# Patient Record
Sex: FEMALE | Race: WHITE | NOT HISPANIC OR LATINO | URBAN - METROPOLITAN AREA
[De-identification: names, ages, dates, MRNs, and addresses within clinical notes are randomized per-mention and may not be internally consistent; named-entity substitution may affect disease eponyms.]

---

## 2017-02-13 ENCOUNTER — INPATIENT (INPATIENT)
Facility: HOSPITAL | Age: 32
LOS: 4 days | Discharge: HOME | End: 2017-02-18
Attending: INTERNAL MEDICINE | Admitting: HOSPITALIST
Payer: COMMERCIAL

## 2017-02-13 VITALS
OXYGEN SATURATION: 100 % | HEART RATE: 75 BPM | TEMPERATURE: 98 F | DIASTOLIC BLOOD PRESSURE: 76 MMHG | RESPIRATION RATE: 16 BRPM | WEIGHT: 250 LBS | SYSTOLIC BLOOD PRESSURE: 125 MMHG

## 2017-02-13 LAB
ALBUMIN SERPL ELPH-MCNC: 3.4 G/DL — SIGNIFICANT CHANGE UP (ref 3.3–5)
ALP SERPL-CCNC: 110 U/L — SIGNIFICANT CHANGE UP (ref 40–120)
ALT FLD-CCNC: 43 U/L — SIGNIFICANT CHANGE UP (ref 12–78)
ANION GAP SERPL CALC-SCNC: 11 MMOL/L — SIGNIFICANT CHANGE UP (ref 5–17)
AST SERPL-CCNC: 34 U/L — SIGNIFICANT CHANGE UP (ref 15–37)
BASOPHILS # BLD AUTO: 0.1 K/UL — SIGNIFICANT CHANGE UP (ref 0–0.2)
BASOPHILS NFR BLD AUTO: 1 % — SIGNIFICANT CHANGE UP (ref 0–2)
BILIRUB SERPL-MCNC: 0.3 MG/DL — SIGNIFICANT CHANGE UP (ref 0.2–1.2)
BUN SERPL-MCNC: 14 MG/DL — SIGNIFICANT CHANGE UP (ref 7–23)
CALCIUM SERPL-MCNC: 9 MG/DL — SIGNIFICANT CHANGE UP (ref 8.5–10.1)
CHLORIDE SERPL-SCNC: 107 MMOL/L — SIGNIFICANT CHANGE UP (ref 96–108)
CO2 SERPL-SCNC: 24 MMOL/L — SIGNIFICANT CHANGE UP (ref 22–31)
CREAT SERPL-MCNC: 0.78 MG/DL — SIGNIFICANT CHANGE UP (ref 0.5–1.3)
EOSINOPHIL # BLD AUTO: 0.1 K/UL — SIGNIFICANT CHANGE UP (ref 0–0.5)
EOSINOPHIL NFR BLD AUTO: 1 % — SIGNIFICANT CHANGE UP (ref 0–6)
GLUCOSE SERPL-MCNC: 124 MG/DL — HIGH (ref 70–99)
HCT VFR BLD CALC: 41.9 % — SIGNIFICANT CHANGE UP (ref 34.5–45)
HGB BLD-MCNC: 14.6 G/DL — SIGNIFICANT CHANGE UP (ref 11.5–15.5)
LYMPHOCYTES # BLD AUTO: 26 % — SIGNIFICANT CHANGE UP (ref 13–44)
LYMPHOCYTES # BLD AUTO: 5.8 K/UL — HIGH (ref 1–3.3)
MCHC RBC-ENTMCNC: 29.8 PG — SIGNIFICANT CHANGE UP (ref 27–34)
MCHC RBC-ENTMCNC: 34.8 GM/DL — SIGNIFICANT CHANGE UP (ref 32–36)
MCV RBC AUTO: 85.8 FL — SIGNIFICANT CHANGE UP (ref 80–100)
MONOCYTES # BLD AUTO: 1 K/UL — HIGH (ref 0–0.9)
MONOCYTES NFR BLD AUTO: 7 % — SIGNIFICANT CHANGE UP (ref 2–14)
NEUTROPHILS # BLD AUTO: 15.4 K/UL — HIGH (ref 1.8–7.4)
NEUTROPHILS NFR BLD AUTO: 63 % — SIGNIFICANT CHANGE UP (ref 43–77)
PLAT MORPH BLD: NORMAL — SIGNIFICANT CHANGE UP
PLATELET # BLD AUTO: 369 K/UL — SIGNIFICANT CHANGE UP (ref 150–400)
POTASSIUM SERPL-MCNC: 3.4 MMOL/L — LOW (ref 3.5–5.3)
POTASSIUM SERPL-SCNC: 3.4 MMOL/L — LOW (ref 3.5–5.3)
PROT SERPL-MCNC: 7.8 GM/DL — SIGNIFICANT CHANGE UP (ref 6–8.3)
RAPID RVP RESULT: DETECTED
RBC # BLD: 4.88 M/UL — SIGNIFICANT CHANGE UP (ref 3.8–5.2)
RBC # FLD: 12.9 % — SIGNIFICANT CHANGE UP (ref 10.3–14.5)
RBC BLD AUTO: NORMAL — SIGNIFICANT CHANGE UP
RSV RNA SPEC QL NAA+PROBE: DETECTED
SODIUM SERPL-SCNC: 142 MMOL/L — SIGNIFICANT CHANGE UP (ref 135–145)
VARIANT LYMPHS # BLD: 2 % — SIGNIFICANT CHANGE UP (ref 0–6)
WBC # BLD: 22.4 K/UL — HIGH (ref 3.8–10.5)
WBC # FLD AUTO: 22.4 K/UL — HIGH (ref 3.8–10.5)

## 2017-02-13 PROCEDURE — 93010 ELECTROCARDIOGRAM REPORT: CPT

## 2017-02-13 PROCEDURE — 71020: CPT | Mod: 26

## 2017-02-13 PROCEDURE — 99285 EMERGENCY DEPT VISIT HI MDM: CPT

## 2017-02-13 PROCEDURE — 71250 CT THORAX DX C-: CPT | Mod: 26

## 2017-02-13 RX ORDER — SODIUM CHLORIDE 9 MG/ML
1000 INJECTION INTRAMUSCULAR; INTRAVENOUS; SUBCUTANEOUS ONCE
Qty: 0 | Refills: 0 | Status: COMPLETED | OUTPATIENT
Start: 2017-02-13 | End: 2017-02-13

## 2017-02-13 RX ORDER — IPRATROPIUM/ALBUTEROL SULFATE 18-103MCG
3 AEROSOL WITH ADAPTER (GRAM) INHALATION ONCE
Qty: 0 | Refills: 0 | Status: COMPLETED | OUTPATIENT
Start: 2017-02-13 | End: 2017-02-13

## 2017-02-13 RX ORDER — MAGNESIUM SULFATE 500 MG/ML
2 VIAL (ML) INJECTION ONCE
Qty: 0 | Refills: 0 | Status: COMPLETED | OUTPATIENT
Start: 2017-02-13 | End: 2017-02-13

## 2017-02-13 RX ADMIN — Medication 3 MILLILITER(S): at 21:01

## 2017-02-13 RX ADMIN — Medication 50 GRAM(S): at 22:00

## 2017-02-13 RX ADMIN — SODIUM CHLORIDE 1500 MILLILITER(S): 9 INJECTION INTRAMUSCULAR; INTRAVENOUS; SUBCUTANEOUS at 21:00

## 2017-02-13 RX ADMIN — Medication 125 MILLIGRAM(S): at 21:01

## 2017-02-13 RX ADMIN — Medication 3 MILLILITER(S): at 21:30

## 2017-02-13 RX ADMIN — Medication 3 MILLILITER(S): at 20:00

## 2017-02-13 NOTE — ED STATDOCS - OBJECTIVE STATEMENT
30 y/o female with hx of asthma presents to the ED c/o cough and wheezing. Pt states she has been on steroids several times in the last several weeks and recently started another course of steroids. She states over the last couple of days cough and wheezing worsened and was told to come in by pulmonologist. She used rescue inhaler without significant relief. Pulmonologist= Luis.

## 2017-02-13 NOTE — ED STATDOCS - MEDICAL DECISION MAKING DETAILS
31y F w/ asthma exacerbation failing outpatient therapy.  Plan for duonebs, steroids.  Given long time course, also assess w/ CT Chest.  If no improvement with nebs, consider mg.

## 2017-02-13 NOTE — ED STATDOCS - DETAILS:
I, Yoni Oviedo DO,  performed the initial face to face bedside interview with this patient regarding history of present illness, review of symptoms and relevant past medical, social and family history.  I completed an independent physical examination.  I was the initial provider who evaluated this patient. I have signed out the follow up of any pending tests (i.e. labs, radiological studies) to the ACP.  I have communicated the patient’s plan of care and disposition with the ACP.  The history, relevant review of systems, past medical and surgical history, medical decision making, and physical examination was documented by the scribe in my presence and I attest to the accuracy of the documentation.

## 2017-02-13 NOTE — ED STATDOCS - PROGRESS NOTE DETAILS
32 y/o female with hx of asthma presents to the ED c/o cough and wheezing. Pt states she has been on steroids several times in the last several weeks and recently started another course of steroids. She states over the last couple of days cough and wheezing worsened and was told to come in by pulmonologist. She used rescue inhaler without significant relief. Pulmonologist= Luis. Pt to be sent to main ED for further evaluation. Yoni Oviedo DO (Attending): Discussed all results with patient and family.  Reassessed at bedside, full resolution of inspiratory wheezes, expiratory wheezes persistent but improved with much improved air entry.  Pt feels better but only 50%.  Given persistent respiratory symptoms, will admit to hospitalist for continued treatment.

## 2017-02-13 NOTE — ED STATDOCS - NS ED MD SCRIBE ATTENDING SCRIBE SECTIONS
DISPOSITION/PROGRESS NOTE PAST MEDICAL/SURGICAL/SOCIAL HISTORY/REVIEW OF SYSTEMS/HISTORY OF PRESENT ILLNESS/PHYSICAL EXAM/DISPOSITION

## 2017-02-14 LAB
ANION GAP SERPL CALC-SCNC: 9 MMOL/L — SIGNIFICANT CHANGE UP (ref 5–17)
BASOPHILS # BLD AUTO: 0.1 K/UL — SIGNIFICANT CHANGE UP (ref 0–0.2)
BASOPHILS NFR BLD AUTO: 0.4 % — SIGNIFICANT CHANGE UP (ref 0–2)
BUN SERPL-MCNC: 10 MG/DL — SIGNIFICANT CHANGE UP (ref 7–23)
CALCIUM SERPL-MCNC: 8.6 MG/DL — SIGNIFICANT CHANGE UP (ref 8.5–10.1)
CHLORIDE SERPL-SCNC: 110 MMOL/L — HIGH (ref 96–108)
CO2 SERPL-SCNC: 23 MMOL/L — SIGNIFICANT CHANGE UP (ref 22–31)
CREAT SERPL-MCNC: 0.76 MG/DL — SIGNIFICANT CHANGE UP (ref 0.5–1.3)
EOSINOPHIL # BLD AUTO: 0 K/UL — SIGNIFICANT CHANGE UP (ref 0–0.5)
EOSINOPHIL NFR BLD AUTO: 0 % — SIGNIFICANT CHANGE UP (ref 0–6)
GLUCOSE SERPL-MCNC: 154 MG/DL — HIGH (ref 70–99)
HCT VFR BLD CALC: 39.1 % — SIGNIFICANT CHANGE UP (ref 34.5–45)
HGB BLD-MCNC: 13 G/DL — SIGNIFICANT CHANGE UP (ref 11.5–15.5)
LYMPHOCYTES # BLD AUTO: 11 % — LOW (ref 13–44)
LYMPHOCYTES # BLD AUTO: 2.2 K/UL — SIGNIFICANT CHANGE UP (ref 1–3.3)
MCHC RBC-ENTMCNC: 28.7 PG — SIGNIFICANT CHANGE UP (ref 27–34)
MCHC RBC-ENTMCNC: 33.1 GM/DL — SIGNIFICANT CHANGE UP (ref 32–36)
MCV RBC AUTO: 86.5 FL — SIGNIFICANT CHANGE UP (ref 80–100)
MONOCYTES # BLD AUTO: 0.9 K/UL — SIGNIFICANT CHANGE UP (ref 0–0.9)
MONOCYTES NFR BLD AUTO: 4.4 % — SIGNIFICANT CHANGE UP (ref 2–14)
NEUTROPHILS # BLD AUTO: 16.7 K/UL — HIGH (ref 1.8–7.4)
NEUTROPHILS NFR BLD AUTO: 84.2 % — HIGH (ref 43–77)
PLATELET # BLD AUTO: 380 K/UL — SIGNIFICANT CHANGE UP (ref 150–400)
POTASSIUM SERPL-MCNC: 4.2 MMOL/L — SIGNIFICANT CHANGE UP (ref 3.5–5.3)
POTASSIUM SERPL-SCNC: 4.2 MMOL/L — SIGNIFICANT CHANGE UP (ref 3.5–5.3)
RBC # BLD: 4.52 M/UL — SIGNIFICANT CHANGE UP (ref 3.8–5.2)
RBC # FLD: 12.9 % — SIGNIFICANT CHANGE UP (ref 10.3–14.5)
SODIUM SERPL-SCNC: 142 MMOL/L — SIGNIFICANT CHANGE UP (ref 135–145)
WBC # BLD: 19.8 K/UL — HIGH (ref 3.8–10.5)
WBC # FLD AUTO: 19.8 K/UL — HIGH (ref 3.8–10.5)

## 2017-02-14 RX ORDER — AZELASTINE 137 UG/1
2 SPRAY, METERED NASAL
Qty: 0 | Refills: 0 | COMMUNITY

## 2017-02-14 RX ORDER — ELETRIPTAN HYDROBROMIDE 20 MG/1
1 TABLET, FILM COATED ORAL
Qty: 0 | Refills: 0 | COMMUNITY

## 2017-02-14 RX ORDER — POTASSIUM CHLORIDE 20 MEQ
40 PACKET (EA) ORAL ONCE
Qty: 0 | Refills: 0 | Status: COMPLETED | OUTPATIENT
Start: 2017-02-14 | End: 2017-02-14

## 2017-02-14 RX ORDER — OMEPRAZOLE 10 MG/1
1 CAPSULE, DELAYED RELEASE ORAL
Qty: 0 | Refills: 0 | COMMUNITY

## 2017-02-14 RX ORDER — TOPIRAMATE 25 MG
50 TABLET ORAL AT BEDTIME
Qty: 0 | Refills: 0 | Status: DISCONTINUED | OUTPATIENT
Start: 2017-02-14 | End: 2017-02-18

## 2017-02-14 RX ORDER — L.ACIDOPH/B.ANIMALIS/B.LONGUM 15B CELL
2 CAPSULE ORAL
Qty: 0 | Refills: 0 | COMMUNITY

## 2017-02-14 RX ORDER — BROMPHENIRAMINE MALEATE, PSEUDOEPHEDRINE HYDROCHLORIDE, AND DEXTROMETHORPHAN HYDROBROMIDE 2; 10; 30 MG/5ML; MG/5ML; MG/5ML
10 SOLUTION ORAL
Qty: 0 | Refills: 0 | COMMUNITY

## 2017-02-14 RX ORDER — IPRATROPIUM/ALBUTEROL SULFATE 18-103MCG
3 AEROSOL WITH ADAPTER (GRAM) INHALATION EVERY 6 HOURS
Qty: 0 | Refills: 0 | Status: DISCONTINUED | OUTPATIENT
Start: 2017-02-14 | End: 2017-02-15

## 2017-02-14 RX ORDER — ALBUTEROL 90 UG/1
2 AEROSOL, METERED ORAL
Qty: 0 | Refills: 0 | COMMUNITY

## 2017-02-14 RX ORDER — TOPIRAMATE 25 MG
2 TABLET ORAL
Qty: 0 | Refills: 0 | COMMUNITY
Start: 2017-02-14 | End: 2017-02-17

## 2017-02-14 RX ORDER — CEFUROXIME AXETIL 250 MG
1 TABLET ORAL
Qty: 0 | Refills: 0 | COMMUNITY

## 2017-02-14 RX ORDER — BUDESONIDE AND FORMOTEROL FUMARATE DIHYDRATE 160; 4.5 UG/1; UG/1
2 AEROSOL RESPIRATORY (INHALATION)
Qty: 0 | Refills: 0 | COMMUNITY
Start: 2017-02-14

## 2017-02-14 RX ADMIN — Medication 40 MILLIGRAM(S): at 20:40

## 2017-02-14 RX ADMIN — Medication 3 MILLILITER(S): at 20:08

## 2017-02-14 RX ADMIN — Medication 3 MILLILITER(S): at 10:55

## 2017-02-14 RX ADMIN — Medication 40 MILLIEQUIVALENT(S): at 10:38

## 2017-02-14 RX ADMIN — Medication 40 MILLIGRAM(S): at 14:57

## 2017-02-14 RX ADMIN — Medication 3 MILLILITER(S): at 14:12

## 2017-02-14 RX ADMIN — Medication 50 MILLIGRAM(S): at 21:56

## 2017-02-14 NOTE — ED ADULT NURSE REASSESSMENT NOTE - NS ED NURSE REASSESS COMMENT FT1
Received report from Virginia RN at 0200. Patient resting comfortably, no distress noted. No complaints at this time. Eating. Remains on isolation. Will continue to monitor.
Report given to Aliza URIBE
Resting comfortably, VS stable, awaiting admission orders and bed placement. Will continue to monitor.
Patient rec'd from JOJO Abrams. ALert and orient. No acute distress. Sob at times, relief with cough. VSS normal. Oxygen level normal. Awaiting for bed assignment. Isolation in place. Safety and comfort maintained.

## 2017-02-14 NOTE — H&P ADULT - ASSESSMENT
31y female admitted with    *asthma exacerbation, +RSV  - failed outpt high dose po steroids  - continue IV steroids  - isolation  - bronchodilators  - consult Dr. Smith    *leukocytosis  - likely due to recent high dose steroids outpatient  - repeat cbc w/ diff  - afebrile, no infiltrate on CT chest    *migraines  - topamax  -  will bring other home med    *hypokalemia  - replete    *dvt px  - scds, ambulate

## 2017-02-14 NOTE — H&P ADULT - NSHPPHYSICALEXAM_GEN_ALL_CORE
Vital Signs Last 24 Hrs  T(C): 36.4, Max: 36.7 (02-13 @ 19:25)  T(F): 97.5, Max: 98.1 (02-13 @ 19:25)  HR: 88 (75 - 98)  BP: 125/99 (112/92 - 125/99)  RR: 16 (16 - 20)  SpO2: 98% (98% - 100%)      General: NAD, comfortable, speaking complete sentences w/o difficulty    Neuro: AAOx3, no focal deficits  HEENT: NCAT, PERRLA, EOMI, moist mucous membranes  Neck: Soft and supple, No JVD  Respiratory: diffuse expiratory wheezing, no rales or rhonchi, +coughing w/ deep inspiration  Cardiovascular: S1 and S2, RRR, no m/g/r  Gastrointestinal: +BS, soft, NTND, no rebound/guarding  Extremities: No c/c/e  Vascular: 2+ peripheral pulses  Musculoskeletal: 5/5 strength b/l UE and LE, sensation intact  Skin: No rashes

## 2017-02-14 NOTE — H&P ADULT - PMH
Asthma    Chiari malformation type I    Nonintractable migraine, unspecified migraine type    PCOS (polycystic ovarian syndrome)

## 2017-02-14 NOTE — H&P ADULT - NSHPLABSRESULTS_GEN_ALL_CORE
14.6   22.4  )-----------( 369      ( 13 Feb 2017 20:56 )             41.9     13 Feb 2017 20:56    142    |  107    |  14     ----------------------------<  124    3.4     |  24     |  0.78     Ca    9.0        13 Feb 2017 20:56    Rapid Respiratory Viral Panel (02.13.17 @ 20:56)    Rapid RVP Result: Detected: The FilmArray RVP Rapid uses polymerase chain reaction (PCR) and melt  curve analysis to screen for adenovirus; coronavirus HKU1, NL63, 229E,  OC43; human metapneumovirus (hMPV); human enterovirus/rhinovirus  (Entero/RV); influenza A; influenza A/H1;Detected: influenza A/H3; influenza  A/H1-2009; influenza B; parainfluenza viruses 1, 2, 3, 4; respiratory  syncytial virus; Bordetella pertussis; Mycoplasma pneumoniae; and  Chlamydophila pneumoniae.    Resp Syncytial Virus (RapRVP): Detected: Called to Nurse/Doctor. BRIDGER Barboza RN 02/13/17 23:30        EXAM:  CT CHEST                       02/13/2017  - no focal consolidation

## 2017-02-14 NOTE — H&P ADULT - NSHPREVIEWOFSYSTEMS_GEN_ALL_CORE
REVIEW OF SYSTEMS:    General: No weakness, fevers, chills  HEENT: +migraine, no neck pain, no visual changes, no hearing loss   Resp: +cough nonproductive, +wheezing, no hemoptysis; No shortness of breath  CV: No chest pain or palpitations  GI: No abdominal pain, no n/v/d, no blood in stool  : No f/u/d  Neuro: No weakness or numbness  MSK: No myalgias, arthralgias  SKIN: No itching, rashes, lesions  All other ROS negative unless indicated above.

## 2017-02-14 NOTE — H&P ADULT - HISTORY OF PRESENT ILLNESS
31y female w/ PMH PCOS, migraines, asthma presents to ED w/ worsening cough, wheezing and dyspnea over past 5days.  Pt states she has been experiencing similar sx, but less severe, since early January.  She has followed up with her Pulmonologist multiple times over past 2 months and was started on a second high dose steroid taper last week as well as Ceftin.   Wheezing and sob worsened over weekend requiring her to use entire rescue inhaler without relief.  Cough nonproductive.  Sob only w/ cough.  Denies chest pain, fevers, n/v/d, abd pain.  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  No recent travel.  Has had ER visits in past for asthma exacerbation but never required admission.      In ED pt received-   Reports feeling  slightly better- still wheezing.  Denies shortness of breath.

## 2017-02-15 RX ORDER — TIOTROPIUM BROMIDE 18 UG/1
1 CAPSULE ORAL; RESPIRATORY (INHALATION) DAILY
Qty: 0 | Refills: 0 | Status: DISCONTINUED | OUTPATIENT
Start: 2017-02-15 | End: 2017-02-16

## 2017-02-15 RX ORDER — PANTOPRAZOLE SODIUM 20 MG/1
40 TABLET, DELAYED RELEASE ORAL
Qty: 0 | Refills: 0 | Status: DISCONTINUED | OUTPATIENT
Start: 2017-02-15 | End: 2017-02-18

## 2017-02-15 RX ORDER — LACTOBACILLUS ACIDOPHILUS 100MM CELL
1 CAPSULE ORAL
Qty: 0 | Refills: 0 | Status: DISCONTINUED | OUTPATIENT
Start: 2017-02-15 | End: 2017-02-18

## 2017-02-15 RX ORDER — IPRATROPIUM/ALBUTEROL SULFATE 18-103MCG
3 AEROSOL WITH ADAPTER (GRAM) INHALATION EVERY 4 HOURS
Qty: 0 | Refills: 0 | Status: DISCONTINUED | OUTPATIENT
Start: 2017-02-15 | End: 2017-02-18

## 2017-02-15 RX ORDER — ALBUTEROL 90 UG/1
1 AEROSOL, METERED ORAL EVERY 4 HOURS
Qty: 0 | Refills: 0 | Status: DISCONTINUED | OUTPATIENT
Start: 2017-02-15 | End: 2017-02-18

## 2017-02-15 RX ADMIN — Medication 3 MILLILITER(S): at 01:40

## 2017-02-15 RX ADMIN — Medication 40 MILLIGRAM(S): at 15:37

## 2017-02-15 RX ADMIN — Medication 40 MILLIGRAM(S): at 08:24

## 2017-02-15 RX ADMIN — Medication 50 MILLIGRAM(S): at 22:11

## 2017-02-15 RX ADMIN — Medication 3 MILLILITER(S): at 12:14

## 2017-02-15 RX ADMIN — Medication 40 MILLIGRAM(S): at 22:09

## 2017-02-15 RX ADMIN — Medication 3 MILLILITER(S): at 08:42

## 2017-02-15 RX ADMIN — Medication 3 MILLILITER(S): at 19:21

## 2017-02-15 RX ADMIN — Medication 3 MILLILITER(S): at 15:55

## 2017-02-15 RX ADMIN — Medication 40 MILLIGRAM(S): at 02:35

## 2017-02-15 RX ADMIN — Medication 1 TABLET(S): at 17:27

## 2017-02-15 RX ADMIN — PANTOPRAZOLE SODIUM 40 MILLIGRAM(S): 20 TABLET, DELAYED RELEASE ORAL at 17:27

## 2017-02-15 NOTE — PROGRESS NOTE ADULT - ASSESSMENT
Assessment		  31y female admitted with    *asthma exacerbation, +RSV  - failed outpt high dose po steroids  - continue IV steroids  - isolation  - bronchodilators  - Pulm consult appreciated     *leukocytosis  - likely due to recent high dose steroids outpatient  - repeat cbc w/ diff- improving   - afebrile, no infiltrate on CT chest    *migraines  - topamax  -  will bring other home med    *hypokalemia  - repleted    *dvt px  - scds, ambulate

## 2017-02-15 NOTE — CONSULT NOTE ADULT - ASSESSMENT
PROBLEMS;    Asthma exacerbation, +RSV  leukocytosis-due to recent high dose steroids-afebrile-no infiltrate on CT chest  Migraines    PLAN;    IV STEROIDS  AEROSOLS  COUGH SUPPRASANTS  OOB  DVT PROPHYLASIX

## 2017-02-15 NOTE — CONSULT NOTE ADULT - SUBJECTIVE AND OBJECTIVE BOX
HPI:    pat seen & examine & full consult dictated.    PAST MEDICAL & SURGICAL HISTORY:  Nonintractable migraine, unspecified migraine type  Chiari malformation type I  PCOS (polycystic ovarian syndrome)  Asthma  No significant past surgical history      MEDICATIONS  (STANDING):  methylPREDNISolone sodium succinate Injectable 40milliGRAM(s) IV Push every 6 hours  topiramate 50milliGRAM(s) Oral at bedtime  ALBUTerol/ipratropium for Nebulization 3milliLiter(s) Nebulizer every 6 hours    MEDICATIONS  (PRN):      Allergies    No Known Allergies    Intolerances        SOCIAL HISTORY: Denies tobacco, etoh abuse or illicit drug use    FAMILY HISTORY:  No pertinent family history in first degree relatives      Vital Signs Last 24 Hrs  T(C): 36.3, Max: 36.8 (02-14 @ 12:53)  T(F): 97.4, Max: 98.2 (02-14 @ 12:53)  HR: 71 (64 - 101)  BP: 113/67 (94/57 - 115/78)  BP(mean): --  RR: 20 (18 - 21)  SpO2: 96% (95% - 100%)    REVIEW OF SYSTEMS:    CONSTITUTIONAL:  As per HPI.  HEENT:  Eyes:  No diplopia or blurred vision. ENT:  No earache, sore throat or runny nose.  CARDIOVASCULAR:  No pressure, squeezing, tightness, heaviness or aching about the chest, neck, axilla or epigastrium.  RESPIRATORY:  No cough, shortness of breath, PND or orthopnea.  GASTROINTESTINAL:  No nausea, vomiting or diarrhea.  GENITOURINARY:  No dysuria, frequency or urgency.  MUSCULOSKELETAL:  As per HPI.  SKIN:  No change in skin, hair or nails.  NEUROLOGIC:  No paresthesias, fasciculations, seizures or weakness.  PSYCHIATRIC:  No disorder of thought or mood.  ENDOCRINE:  No heat or cold intolerance, polyuria or polydipsia.  HEMATOLOGICAL:  No easy bruising or bleedings:  .     PHYSICAL EXAMINATION:    GENERAL APPEARANCE:  Pt. is not currently dyspneic, in no distress. Pt. is alert, oriented, and pleasant.  HEENT:  Pupils are normal and react normally. No icterus. Mucous membranes well colored.  NECK:  Supple. No lymphadenopathy. Jugular venous pressure not elevated. Carotids equal.   HEART:   The cardiac impulse has a normal quality. Regular. Normal S1 and S2. There are no murmurs, rubs or gallops noted  CHEST:  Chest is clear to auscultation. Normal respiratory effort.  ABDOMEN:  Soft and nontender.   EXTREMITIES:  There is no cyanosis, clubbing or edema.   SKIN:  No rash or significant lesions are noted.    LABS:                        13.0   19.8  )-----------( 380      ( 14 Feb 2017 11:44 )             39.1     14 Feb 2017 11:44    142    |  110    |  10     ----------------------------<  154    4.2     |  23     |  0.76     Ca    8.6        14 Feb 2017 11:44    TPro  7.8    /  Alb  3.4    /  TBili  0.3    /  DBili  x      /  AST  34     /  ALT  43     /  AlkPhos  110    13 Feb 2017 20:56    LIVER FUNCTIONS - ( 13 Feb 2017 20:56 )  Alb: 3.4 g/dL / Pro: 7.8 gm/dL / ALK PHOS: 110 U/L / ALT: 43 U/L / AST: 34 U/L / GGT: x           Rapid Respiratory Viral Panel (02.13.17 @ 20:56)    Rapid RVP Result: Detected: The FilmArray RVP Rapid uses polymerase chain reaction (PCR) and melt  curve analysis to screen for adenovirus; coronavirus HKU1, NL63, 229E,  OC43; human metapneumovirus (hMPV); human enterovirus/rhinovirus  (Entero/RV); influenza A; influenza A/H1;Detected: influenza A/H3; influenza  A/H1-2009; influenza B; parainfluenza viruses 1, 2, 3, 4; respiratory  syncytial virus; Bordetella pertussis; Mycoplasma pneumoniae; and  Chlamydophila pneumoniae.  Resp Syncytial Virus (RapRVP): Detected: Called to Nurse/Doctor. BRIDGER Barboza RN 02/13/17 23:30    ct of chest;    PLEURA: No pleural effusion or pneumothorax.  HEART: Normal size. No pericardial effusion.  VESSELS: Limited evaluation without intravenous contrast. Normal caliber   of the major intrathoracic vessels.   CHEST WALL AND LOWER NECK: Within normal limits.   MEDIASTINUM ANDHILA: Subcentimeter mediastinal lymph nodes without   lymLUNGS AND AIRWAYS: Patent central airways. No focal consolidation.   phadenopathy.  UPPER ABDOMEN: Layering of density in the gallbladder, which may   represent sludge.   BONES: Degenerative changes of the spine. Age indeterminate, superior   endplate depressionat T12 vertebral body, felt to be chronic.    IMPRESSION:    No focal consolidation. Additional findings as described.            RADIOLOGY & ADDITIONAL STUDIES:

## 2017-02-15 NOTE — PROGRESS NOTE ADULT - SUBJECTIVE AND OBJECTIVE BOX
History and Physical:   Source of Information	Chart(s), Patient	  Outpatient Providers	Dr. Smith	    Language:  · Patient/Family of Limited English Proficiency	No	      History of Present Illness:  Chief Complaint/Reason for Admission: cough, wheezing, shortness of breath	  History of Present Illness: 	  31y female w/ PMH PCOS, migraines, asthma presents to ED w/ worsening cough, wheezing and dyspnea over past 5days.  Pt states she has been experiencing similar sx, but less severe, since early January.  She has followed up with her Pulmonologist multiple times over past 2 months and was started on a second high dose steroid taper last week as well as Ceftin.   Wheezing and sob worsened over weekend requiring her to use entire rescue inhaler without relief.  Cough nonproductive.  Sob only w/ cough.  Denies chest pain, fevers, n/v/d, abd pain.  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  No recent travel.  Has had ER visits in past for asthma exacerbation but never required admission.      Reports feeling  slightly better- still wheezing.  Denies shortness of breath.     2/15-       Review of Systems:  Review of Systems: REVIEW OF SYSTEMS:  General: No weakness, fevers, chills HEENT: +migraine, no neck pain, no visual changes, no hearing loss  Resp: +cough nonproductive, +wheezing, no hemoptysis; No shortness of breath CV: No chest pain or palpitations GI: No abdominal pain, no n/v/d, no blood in stool : No f/u/d Neuro: No weakness or numbness MSK: No myalgias, arthralgias SKIN: No itching, rashes, lesions All other ROS negative unless indicated above.	      Vital Signs Last 24 Hrs  T(C): 36.9, Max: 36.9 (02-15 @ 13:15)  T(F): 98.4, Max: 98.4 (02-15 @ 13:15)  HR: 94 (64 - 101)  BP: 114/68 (94/57 - 115/78)  RR: 18 (18 - 21)  SpO2: 98% (95% - 100%)    Physical Exam:  General: NAD, comfortable, speaking complete sentences w/o difficulty Neuro: AAOx3, no focal deficits HEENT: NCAT, PERRLA, EOMI, moist mucous membranes Neck: Soft and supple, No JVD Respiratory: diffuse expiratory wheezing, no rales or rhonchi, +coughing w/ deep inspiration Cardiovascular: S1 and S2, RRR, no m/g/r Gastrointestinal: +BS, soft, NTND, no rebound/guarding Extremities: No c/c/e Vascular: 2+ peripheral pulses Musculoskeletal: 5/5 strength b/l UE and LE, sensation intact Skin: No rashes	      Labs and Results:        LABS: All Labs Reviewed:                        13.0   19.8  )-----------( 380      ( 14 Feb 2017 11:44 )             39.1     14 Feb 2017 11:44    142    |  110    |  10     ----------------------------<  154    4.2     |  23     |  0.76     Ca    8.6        14 Feb 2017 11:44    TPro  7.8    /  Alb  3.4    /  TBili  0.3    /  DBili  x      /  AST  34     /  ALT  43     /  AlkPhos  110    13 Feb 2017 20:56          Blood Culture: 02-13 @ 21:49  Organism --  Gram Stain Blood -- Gram Stain --  Specimen Source .Blood None  Culture-Blood --            Labs, Radiology, Cardiology, and Other Results: 14.6  22.4  )-----------( 369      ( 13 Feb 2017 20:56 )            41.9   13 Feb 2017 20:56  142    |  107    |  14    ----------------------------<  124   3.4     |  24     |  0.78   Ca    9.0        13 Feb 2017 20:56  Rapid Respiratory Viral Panel (02.13.17 @ 20:56)   Rapid RVP Result: Detected: The FilmArray RVP Rapid uses polymerase chain reaction (PCR) and melt curve analysis to screen for adenovirus; coronavirus HKU1, NL63, 229E, OC43; human metapneumovirus (hMPV); human enterovirus/rhinovirus (Entero/RV); influenza A; influenza A/H1;Detected: influenza A/H3; influenza A/H1-2009; influenza B; parainfluenza viruses 1, 2, 3, 4; respiratory syncytial virus; Bordetella pertussis; Mycoplasma pneumoniae; and Chlamydophila pneumoniae.   Resp Syncytial Virus (RapRVP): Detected: Called to Nurse/Doctor. BRIDGER Barboza RN 02/13/17 23:30    EXAM:  CT CHEST                       02/13/2017  - no focal consolidation History and Physical:   Source of Information	Chart(s), Patient	  Outpatient Providers	Dr. Smith	    Language:  · Patient/Family of Limited English Proficiency	No	      History of Present Illness:  Chief Complaint/Reason for Admission: cough, wheezing, shortness of breath	  History of Present Illness: 	  31y female w/ PMH PCOS, migraines, asthma presents to ED w/ worsening cough, wheezing and dyspnea over past 5days.  Pt states she has been experiencing similar sx, but less severe, since early January.  She has followed up with her Pulmonologist multiple times over past 2 months and was started on a second high dose steroid taper last week as well as Ceftin.   Wheezing and sob worsened over weekend requiring her to use entire rescue inhaler without relief.  Cough nonproductive.  Sob only w/ cough.  Denies chest pain, fevers, n/v/d, abd pain.  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  No recent travel.  Has had ER visits in past for asthma exacerbation but never required admission.      Reports feeling  slightly better- still wheezing.  Denies shortness of breath.     2/15- cough persists.  No other complaints.       Review of Systems:  Review of Systems:  10 point ROS neg except for above. 	      Vital Signs Last 24 Hrs  T(C): 36.9, Max: 36.9 (02-15 @ 13:15)  T(F): 98.4, Max: 98.4 (02-15 @ 13:15)  HR: 94 (64 - 101)  BP: 114/68 (94/57 - 115/78)  RR: 18 (18 - 21)  SpO2: 98% (95% - 100%)    Physical Exam:  General: NAD, comfortable, speaking complete sentences w/o difficulty Neuro: AAOx3, no focal deficits HEENT: NCAT, PERRLA, EOMI, moist mucous membranes Neck: Soft and supple, No JVD Respiratory: diffuse expiratory wheezing slightly improved, no rales or rhonchi, +coughing w/ deep inspiration Cardiovascular: S1 and S2, RRR, no m/g/r Gastrointestinal: +BS, soft, NTND, no rebound/guarding Extremities: No c/c/e Vascular: 2+ peripheral pulses Musculoskeletal: 5/5 strength b/l UE and LE, sensation intact Skin: No rashes	      Labs and Results:        LABS: All Labs Reviewed:                        13.0   19.8  )-----------( 380      ( 14 Feb 2017 11:44 )             39.1     14 Feb 2017 11:44    142    |  110    |  10     ----------------------------<  154    4.2     |  23     |  0.76     Ca    8.6        14 Feb 2017 11:44    TPro  7.8    /  Alb  3.4    /  TBili  0.3    /  DBili  x      /  AST  34     /  ALT  43     /  AlkPhos  110    13 Feb 2017 20:56          Blood Culture: 02-13 @ 21:49  Organism --  Gram Stain Blood -- Gram Stain --  Specimen Source .Blood None  Culture-Blood --            Labs, Radiology, Cardiology, and Other Results: 14.6  22.4  )-----------( 369      ( 13 Feb 2017 20:56 )            41.9   13 Feb 2017 20:56  142    |  107    |  14    ----------------------------<  124   3.4     |  24     |  0.78   Ca    9.0        13 Feb 2017 20:56  Rapid Respiratory Viral Panel (02.13.17 @ 20:56)   Rapid RVP Result: Detected: The FilmArray RVP Rapid uses polymerase chain reaction (PCR) and melt curve analysis to screen for adenovirus; coronavirus HKU1, NL63, 229E, OC43; human metapneumovirus (hMPV); human enterovirus/rhinovirus (Entero/RV); influenza A; influenza A/H1;Detected: influenza A/H3; influenza A/H1-2009; influenza B; parainfluenza viruses 1, 2, 3, 4; respiratory syncytial virus; Bordetella pertussis; Mycoplasma pneumoniae; and Chlamydophila pneumoniae.   Resp Syncytial Virus (RapRVP): Detected: Called to Nurse/Doctor. BRIDGER Barboza RN 02/13/17 23:30    EXAM:  CT CHEST                       02/13/2017  - no focal consolidation

## 2017-02-16 LAB
HCT VFR BLD CALC: 38.8 % — SIGNIFICANT CHANGE UP (ref 34.5–45)
HGB BLD-MCNC: 12.9 G/DL — SIGNIFICANT CHANGE UP (ref 11.5–15.5)
MCHC RBC-ENTMCNC: 28.9 PG — SIGNIFICANT CHANGE UP (ref 27–34)
MCHC RBC-ENTMCNC: 33.2 GM/DL — SIGNIFICANT CHANGE UP (ref 32–36)
MCV RBC AUTO: 87.1 FL — SIGNIFICANT CHANGE UP (ref 80–100)
PLATELET # BLD AUTO: 366 K/UL — SIGNIFICANT CHANGE UP (ref 150–400)
RBC # BLD: 4.45 M/UL — SIGNIFICANT CHANGE UP (ref 3.8–5.2)
RBC # FLD: 12.8 % — SIGNIFICANT CHANGE UP (ref 10.3–14.5)
WBC # BLD: 20.6 K/UL — HIGH (ref 3.8–10.5)
WBC # FLD AUTO: 20.6 K/UL — HIGH (ref 3.8–10.5)

## 2017-02-16 RX ORDER — BUDESONIDE, MICRONIZED 100 %
0.5 POWDER (GRAM) MISCELLANEOUS EVERY 12 HOURS
Qty: 0 | Refills: 0 | Status: DISCONTINUED | OUTPATIENT
Start: 2017-02-16 | End: 2017-02-18

## 2017-02-16 RX ADMIN — Medication 50 MILLIGRAM(S): at 22:19

## 2017-02-16 RX ADMIN — Medication 40 MILLIGRAM(S): at 11:44

## 2017-02-16 RX ADMIN — Medication 3 MILLILITER(S): at 12:10

## 2017-02-16 RX ADMIN — Medication 40 MILLIGRAM(S): at 04:44

## 2017-02-16 RX ADMIN — Medication 3 MILLILITER(S): at 15:24

## 2017-02-16 RX ADMIN — Medication 40 MILLIGRAM(S): at 17:29

## 2017-02-16 RX ADMIN — Medication 3 MILLILITER(S): at 09:01

## 2017-02-16 RX ADMIN — Medication 3 MILLILITER(S): at 00:20

## 2017-02-16 RX ADMIN — Medication 3 MILLILITER(S): at 19:48

## 2017-02-16 RX ADMIN — Medication 1 TABLET(S): at 08:45

## 2017-02-16 RX ADMIN — Medication 3 MILLILITER(S): at 23:55

## 2017-02-16 RX ADMIN — Medication 3 MILLILITER(S): at 04:34

## 2017-02-16 RX ADMIN — PANTOPRAZOLE SODIUM 40 MILLIGRAM(S): 20 TABLET, DELAYED RELEASE ORAL at 08:45

## 2017-02-16 RX ADMIN — Medication 1 TABLET(S): at 18:30

## 2017-02-16 RX ADMIN — Medication 0.5 MILLIGRAM(S): at 19:50

## 2017-02-16 RX ADMIN — Medication 40 MILLIGRAM(S): at 22:19

## 2017-02-16 NOTE — PROGRESS NOTE ADULT - ASSESSMENT
31y female admitted with    *asthma exacerbation, +RSV  - failed outpt high dose po steroids  - continue IV steroids  - isolation  - bronchodilators  - cough suppressants  - Pulm consult appreciated     *leukocytosis  - likely due to recent high dose steroids outpatient  - repeat cbc w/ diff  - afebrile, no infiltrate on CT chest    *migraines  - topamax and home med    *hypokalemia  - repleted    *dvt px  - scds, ambulate

## 2017-02-16 NOTE — PROGRESS NOTE ADULT - SUBJECTIVE AND OBJECTIVE BOX
Subjective:  Awake, alert. Cough sl improved and decreased. Scant clear sputum    MEDICATIONS  (STANDING):  methylPREDNISolone sodium succinate Injectable 40milliGRAM(s) IV Push every 6 hours  topiramate 50milliGRAM(s) Oral at bedtime  HYDROcodone/homatropine Syrup 5milliLiter(s) Oral four times a day  ALBUTerol/ipratropium for Nebulization 3milliLiter(s) Nebulizer every 4 hours  ALBUTerol    90 MICROgram(s) HFA Inhaler 1Puff(s) Inhalation every 4 hours  pantoprazole    Tablet 40milliGRAM(s) Oral before breakfast  lactobacillus acidophilus 1Tablet(s) Oral two times a day with meals  buDESOnide   0.5 milliGRAM(s) Respule 0.5milliGRAM(s) Inhalation every 12 hours    MEDICATIONS  (PRN):  freetext medication  -  Oral <User Schedule> PRN for headache      Allergies    No Known Allergies    Intolerances        Vital Signs Last 24 Hrs  T(C): 2.6, Max: 36.9 (02-15 @ 13:15)  T(F): 36.6, Max: 98.5 (02-15 @ 23:09)  HR: 81 (70 - 94)  BP: 108/68 (106/61 - 115/60)  BP(mean): --  RR: 18 (16 - 18)  SpO2: 98% (94% - 98%)    PHYSICAL EXAMINATION:    NECK:  Supple. No lymphadenopathy. Jugular venous pressure not elevated. Carotids equal.   HEART:   The cardiac impulse has a normal quality. Reg., Nl S1 and S2.  There are no murmurs, rubs or gallops noted  CHEST:  Chest is fairly  clear with minimal end exp wheeze. Normal respiratory effort.  ABDOMEN:  Soft and nontender.   EXTREMITIES:  There is tr edema.       LABS:                        12.9   20.6  )-----------( 366      ( 16 Feb 2017 06:51 )             38.8     14 Feb 2017 11:44    142    |  110    |  10     ----------------------------<  154    4.2     |  23     |  0.76     Ca    8.6        14 Feb 2017 11:44            RADIOLOGY & ADDITIONAL TESTS:    Assessment and Recommendation:   · Assessment		  PROBLEMS;    Asthma exacerbation, +RSV  Tracheitis  leukocytosis-due to recent high dose steroids-afebrile-no infiltrate on CT chest  Migraines    PLAN;    IV STEROIDS-maintain current dose  AEROSOLS-add budesonide  COUGH SUPPRASANTS  OOB  DVT PROPHYLASIX

## 2017-02-16 NOTE — PROGRESS NOTE ADULT - SUBJECTIVE AND OBJECTIVE BOX
History and Physical:   Source of Information	Chart(s), Patient	  Outpatient Providers	Dr. Smith	    Language:  · Patient/Family of Limited English Proficiency	No	      History of Present Illness:  Chief Complaint/Reason for Admission: cough, wheezing, shortness of breath	  History of Present Illness: 	  31y female w/ PMH PCOS, migraines, asthma presents to ED w/ worsening cough, wheezing and dyspnea over past 5days.  Pt states she has been experiencing similar sx, but less severe, since early January.  She has followed up with her Pulmonologist multiple times over past 2 months and was started on a second high dose steroid taper last week as well as Ceftin.   Wheezing and sob worsened over weekend requiring her to use entire rescue inhaler without relief.  Cough nonproductive.  Sob only w/ cough.  Denies chest pain, fevers, n/v/d, abd pain.  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  No recent travel.  Has had ER visits in past for asthma exacerbation but never required admission.      Reports feeling  slightly better- still wheezing.  Denies shortness of breath.     2/15- cough persists.  No other complaints.   2/16- feeling better.  Cough persists, worse after neb tx.        Review of Systems:  Review of Systems:  10 point ROS neg except for above. 	      Vital Signs Last 24 Hrs  T(C): 36.3, Max: 36.9 (02-15 @ 13:15)  T(F): 97.4, Max: 98.5 (02-15 @ 23:09)  HR: 80 (59 - 94)  BP: 118/71 (106/61 - 118/71)  BP(mean): --  RR: 18 (16 - 18)  SpO2: 98% (93% - 98%)      Vital Signs Last 24 Hrs  T(C): 36.9, Max: 36.9 (02-15 @ 13:15)  T(F): 98.4, Max: 98.4 (02-15 @ 13:15)  HR: 94 (64 - 101)  BP: 114/68 (94/57 - 115/78)  RR: 18 (18 - 21)  SpO2: 98% (95% - 100%)    Physical Exam:  General: NAD, comfortable, speaking complete sentences w/o difficulty Neuro: AAOx3, no focal deficits HEENT: NCAT, PERRLA, EOMI, moist mucous membranes Neck: Soft and supple, No JVD Respiratory: mild expiratory wheezing,  no rales or rhonchi, +coughing w/ deep inspiration Cardiovascular: S1 and S2, RRR, no m/g/r Gastrointestinal: +BS, soft, NTND, no rebound/guarding Extremities: No c/c/e Vascular: 2+ peripheral pulses Musculoskeletal: 5/5 strength b/l UE and LE, sensation intact Skin: No rashes	      Labs and Results:        LABS: All Labs Reviewed:                        12.9   20.6  )-----------( 366      ( 16 Feb 2017 06:51 )             38.8         LABS: All Labs Reviewed:                        13.0   19.8  )-----------( 380      ( 14 Feb 2017 11:44 )             39.1     14 Feb 2017 11:44    142    |  110    |  10     ----------------------------<  154    4.2     |  23     |  0.76     Ca    8.6        14 Feb 2017 11:44    TPro  7.8    /  Alb  3.4    /  TBili  0.3    /  DBili  x      /  AST  34     /  ALT  43     /  AlkPhos  110    13 Feb 2017 20:56            Labs, Radiology, Cardiology, and Other Results: 14.6  22.4  )-----------( 369      ( 13 Feb 2017 20:56 )            41.9   13 Feb 2017 20:56  142    |  107    |  14    ----------------------------<  124   3.4     |  24     |  0.78   Ca    9.0        13 Feb 2017 20:56  Rapid Respiratory Viral Panel (02.13.17 @ 20:56)   Rapid RVP Result: Detected: The FilmArray RVP Rapid uses polymerase chain reaction (PCR) and melt curve analysis to screen for adenovirus; coronavirus HKU1, NL63, 229E, OC43; human metapneumovirus (hMPV); human enterovirus/rhinovirus (Entero/RV); influenza A; influenza A/H1;Detected: influenza A/H3; influenza A/H1-2009; influenza B; parainfluenza viruses 1, 2, 3, 4; respiratory syncytial virus; Bordetella pertussis; Mycoplasma pneumoniae; and Chlamydophila pneumoniae.   Resp Syncytial Virus (RapRVP): Detected: Called to Nurse/Doctor. BRIDGER Barboza RN 02/13/17 23:30    EXAM:  CT CHEST                       02/13/2017  - no focal consolidation

## 2017-02-17 LAB
BASOPHILS # BLD AUTO: 0.1 K/UL — SIGNIFICANT CHANGE UP (ref 0–0.2)
BASOPHILS NFR BLD AUTO: 0.5 % — SIGNIFICANT CHANGE UP (ref 0–2)
EOSINOPHIL # BLD AUTO: 0 K/UL — SIGNIFICANT CHANGE UP (ref 0–0.5)
EOSINOPHIL NFR BLD AUTO: 0 % — SIGNIFICANT CHANGE UP (ref 0–6)
HCT VFR BLD CALC: 40.4 % — SIGNIFICANT CHANGE UP (ref 34.5–45)
HGB BLD-MCNC: 13.2 G/DL — SIGNIFICANT CHANGE UP (ref 11.5–15.5)
LYMPHOCYTES # BLD AUTO: 1.4 K/UL — SIGNIFICANT CHANGE UP (ref 1–3.3)
LYMPHOCYTES # BLD AUTO: 5.9 % — LOW (ref 13–44)
MCHC RBC-ENTMCNC: 28.6 PG — SIGNIFICANT CHANGE UP (ref 27–34)
MCHC RBC-ENTMCNC: 32.8 GM/DL — SIGNIFICANT CHANGE UP (ref 32–36)
MCV RBC AUTO: 87.2 FL — SIGNIFICANT CHANGE UP (ref 80–100)
MONOCYTES # BLD AUTO: 0.9 K/UL — SIGNIFICANT CHANGE UP (ref 0–0.9)
MONOCYTES NFR BLD AUTO: 3.8 % — SIGNIFICANT CHANGE UP (ref 2–14)
NEUTROPHILS # BLD AUTO: 21 K/UL — HIGH (ref 1.8–7.4)
NEUTROPHILS NFR BLD AUTO: 89.9 % — HIGH (ref 43–77)
PLATELET # BLD AUTO: 395 K/UL — SIGNIFICANT CHANGE UP (ref 150–400)
RBC # BLD: 4.64 M/UL — SIGNIFICANT CHANGE UP (ref 3.8–5.2)
RBC # FLD: 12.8 % — SIGNIFICANT CHANGE UP (ref 10.3–14.5)
WBC # BLD: 23.4 K/UL — HIGH (ref 3.8–10.5)
WBC # FLD AUTO: 23.4 K/UL — HIGH (ref 3.8–10.5)

## 2017-02-17 RX ADMIN — Medication 20 MILLIGRAM(S): at 22:53

## 2017-02-17 RX ADMIN — Medication 3 MILLILITER(S): at 03:10

## 2017-02-17 RX ADMIN — Medication 40 MILLIGRAM(S): at 09:37

## 2017-02-17 RX ADMIN — PANTOPRAZOLE SODIUM 40 MILLIGRAM(S): 20 TABLET, DELAYED RELEASE ORAL at 08:01

## 2017-02-17 RX ADMIN — Medication 40 MILLIGRAM(S): at 03:26

## 2017-02-17 RX ADMIN — Medication 3 MILLILITER(S): at 20:33

## 2017-02-17 RX ADMIN — Medication 50 MILLIGRAM(S): at 22:54

## 2017-02-17 RX ADMIN — Medication 1 TABLET(S): at 15:44

## 2017-02-17 RX ADMIN — Medication 0.5 MILLIGRAM(S): at 20:40

## 2017-02-17 RX ADMIN — Medication 3 MILLILITER(S): at 08:59

## 2017-02-17 RX ADMIN — Medication 0.5 MILLIGRAM(S): at 09:02

## 2017-02-17 RX ADMIN — Medication 3 MILLILITER(S): at 16:01

## 2017-02-17 RX ADMIN — Medication 20 MILLIGRAM(S): at 15:43

## 2017-02-17 RX ADMIN — Medication 1 TABLET(S): at 08:01

## 2017-02-17 RX ADMIN — Medication 3 MILLILITER(S): at 11:57

## 2017-02-17 NOTE — PROGRESS NOTE ADULT - SUBJECTIVE AND OBJECTIVE BOX
Subjective:  Awake, alert. Cough is less "barky" this AM. Still had some chest tightness this AM    MEDICATIONS  (STANDING):  methylPREDNISolone sodium succinate Injectable 40milliGRAM(s) IV Push every 6 hours  topiramate 50milliGRAM(s) Oral at bedtime  HYDROcodone/homatropine Syrup 5milliLiter(s) Oral four times a day  ALBUTerol/ipratropium for Nebulization 3milliLiter(s) Nebulizer every 4 hours  ALBUTerol    90 MICROgram(s) HFA Inhaler 1Puff(s) Inhalation every 4 hours  pantoprazole    Tablet 40milliGRAM(s) Oral before breakfast  lactobacillus acidophilus 1Tablet(s) Oral two times a day with meals  buDESOnide   0.5 milliGRAM(s) Respule 0.5milliGRAM(s) Inhalation every 12 hours    MEDICATIONS  (PRN):  freetext medication  - 40milliGRAM(s) Oral <User Schedule> PRN for headache      Allergies    No Known Allergies    Intolerances        Vital Signs Last 24 Hrs  T(C): 36.4, Max: 36.4 (02-16 @ 15:11)  T(F): 97.5, Max: 97.6 (02-16 @ 15:11)  HR: 78 (59 - 82)  BP: 126/78 (114/74 - 126/78)  BP(mean): --  RR: 19 (18 - 19)  SpO2: 95% (93% - 98%)    PHYSICAL EXAMINATION:    NECK:  Supple. No lymphadenopathy. Jugular venous pressure not elevated. Carotids equal.   HEART:   The cardiac impulse has a normal quality. Reg., Nl S1 and S2.  There are no murmurs, rubs or gallops noted  CHEST:  Chest is clear to auscultation. Min end exp wheeze w/ forced maneuvers. Normal respiratory effort.  ABDOMEN:  Soft and nontender.   EXTREMITIES:  There is no edema.       LABS:                        13.2   23.4  )-----------( 395      ( 17 Feb 2017 05:40 )             40.4                 RADIOLOGY & ADDITIONAL TESTS:    Assessment and Recommendation:   · Assessment		  PROBLEMS;    Asthma exacerbation, +RSV  Tracheitis  leukocytosis-due to recent high dose steroids-afebrile-no infiltrate on CT chest  Migraines    PLAN;    IV STEROIDS-decrease to 20mg Q6H  AEROSOLS-add budesonide  COUGH SUPPRASANTS  OOB  DVT PROPHYLASIX  If stable, consider D/C in AM on 2/18

## 2017-02-17 NOTE — PROGRESS NOTE ADULT - SUBJECTIVE AND OBJECTIVE BOX
History and Physical:   Source of Information	Chart(s), Patient	  Outpatient Providers	Dr. Smith	    Language:  · Patient/Family of Limited English Proficiency	No	      History of Present Illness:  Chief Complaint/Reason for Admission: cough, wheezing, shortness of breath	  History of Present Illness: 	  31y female w/ PMH PCOS, migraines, asthma presents to ED w/ worsening cough, wheezing and dyspnea over past 5days.  Pt states she has been experiencing similar sx, but less severe, since early January.  She has followed up with her Pulmonologist multiple times over past 2 months and was started on a second high dose steroid taper last week as well as Ceftin.   Wheezing and sob worsened over weekend requiring her to use entire rescue inhaler without relief.  Cough nonproductive.  Sob only w/ cough.  Denies chest pain, fevers, n/v/d, abd pain.  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  No recent travel.  Has had ER visits in past for asthma exacerbation but never required admission.      Reports feeling  slightly better- still wheezing.  Denies shortness of breath.     2/15- cough persists.  No other complaints.   2/16- feeling better.  Cough persists, worse after neb tx.    2/17-       Review of Systems:  Review of Systems:  10 point ROS neg except for above. 	      Vital Signs Last 24 Hrs  Vital Signs Last 24 Hrs  T(C): 36.4, Max: 36.4 (02-16 @ 15:11)  T(F): 97.6, Max: 97.6 (02-16 @ 15:11)  HR: 76 (65 - 82)  BP: 118/80 (114/74 - 126/78)        Vital Signs Last 24 Hrs  T(C): 36.9, Max: 36.9 (02-15 @ 13:15)  T(F): 98.4, Max: 98.4 (02-15 @ 13:15)  HR: 94 (64 - 101)  BP: 114/68 (94/57 - 115/78)  RR: 18 (18 - 21)  SpO2: 98% (95% - 100%)    Physical Exam:  General: NAD, comfortable, speaking complete sentences w/o difficulty Neuro: AAOx3, no focal deficits HEENT: NCAT, PERRLA, EOMI, moist mucous membranes Neck: Soft and supple, No JVD Respiratory: mild expiratory wheezing,  no rales or rhonchi, +coughing w/ deep inspiration Cardiovascular: S1 and S2, RRR, no m/g/r Gastrointestinal: +BS, soft, NTND, no rebound/guarding Extremities: No c/c/e Vascular: 2+ peripheral pulses Musculoskeletal: 5/5 strength b/l UE and LE, sensation intact Skin: No rashes	      Labs and Results:        LABS: All Labs Reviewed:                        13.2   23.4  )-----------( 395      ( 17 Feb 2017 05:40 )             40.4         Blood Culture: 02-13 @ 21:49  Organism --  Gram Stain Blood -- Gram Stain --  Specimen Source .Blood None  Culture-Blood --        LABS: All Labs Reviewed:                        12.9   20.6  )-----------( 366      ( 16 Feb 2017 06:51 )             38.8         LABS: All Labs Reviewed:                        13.0   19.8  )-----------( 380      ( 14 Feb 2017 11:44 )             39.1     14 Feb 2017 11:44    142    |  110    |  10     ----------------------------<  154    4.2     |  23     |  0.76     Ca    8.6        14 Feb 2017 11:44    TPro  7.8    /  Alb  3.4    /  TBili  0.3    /  DBili  x      /  AST  34     /  ALT  43     /  AlkPhos  110    13 Feb 2017 20:56            Labs, Radiology, Cardiology, and Other Results: 14.6  22.4  )-----------( 369      ( 13 Feb 2017 20:56 )            41.9   13 Feb 2017 20:56  142    |  107    |  14    ----------------------------<  124   3.4     |  24     |  0.78   Ca    9.0        13 Feb 2017 20:56  Rapid Respiratory Viral Panel (02.13.17 @ 20:56)   Rapid RVP Result: Detected: The FilmArray RVP Rapid uses polymerase chain reaction (PCR) and melt curve analysis to screen for adenovirus; coronavirus HKU1, NL63, 229E, OC43; human metapneumovirus (hMPV); human enterovirus/rhinovirus (Entero/RV); influenza A; influenza A/H1;Detected: influenza A/H3; influenza A/H1-2009; influenza B; parainfluenza viruses 1, 2, 3, 4; respiratory syncytial virus; Bordetella pertussis; Mycoplasma pneumoniae; and Chlamydophila pneumoniae.   Resp Syncytial Virus (RapRVP): Detected: Called to Nurse/Doctor. BRIDGER Barboza RN 02/13/17 23:30    EXAM:  CT CHEST                       02/13/2017  - no focal consolidation History and Physical:   Source of Information	Chart(s), Patient	  Outpatient Providers	Dr. Smith	    Language:  · Patient/Family of Limited English Proficiency	No	      History of Present Illness:  Chief Complaint/Reason for Admission: cough, wheezing, shortness of breath	  History of Present Illness: 	  31y female w/ PMH PCOS, migraines, asthma presents to ED w/ worsening cough, wheezing and dyspnea over past 5days.  Pt states she has been experiencing similar sx, but less severe, since early January.  She has followed up with her Pulmonologist multiple times over past 2 months and was started on a second high dose steroid taper last week as well as Ceftin.   Wheezing and sob worsened over weekend requiring her to use entire rescue inhaler without relief.  Cough nonproductive.  Sob only w/ cough.  Denies chest pain, fevers, n/v/d, abd pain.  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  No recent travel.  Has had ER visits in past for asthma exacerbation but never required admission.      Reports feeling  slightly better- still wheezing.  Denies shortness of breath.     2/15- cough persists.  No other complaints.   2/16- feeling better.  Cough persists, worse after neb tx.    2/17- feeling better. Cough and wheezing improving.        Review of Systems:  10 point ROS neg except for above. 	      Vital Signs Last 24 Hrs  T(C): 36.4, Max: 36.4 (02-16 @ 15:11)  T(F): 97.6, Max: 97.6 (02-16 @ 15:11)  HR: 76 (65 - 82)  BP: 118/80 (114/74 - 126/78)        Physical Exam:  General: NAD, comfortable, speaking complete sentences w/o difficulty Neuro: AAOx3, no focal deficits HEENT: NCAT, PERRLA, EOMI, moist mucous membranes Neck: Soft and supple, No JVD Respiratory: mild expiratory wheezing resolved,  no rales or rhonchi Cardiovascular: S1 and S2, RRR, no m/g/r Gastrointestinal: +BS, soft, NTND, no rebound/guarding Extremities: No c/c/e Vascular: 2+ peripheral pulses Musculoskeletal: 5/5 strength b/l UE and LE, sensation intact Skin: No rashes	      Labs and Results:        LABS: All Labs Reviewed:                        13.2   23.4  )-----------( 395      ( 17 Feb 2017 05:40 )             40.4         Blood Culture: 02-13 @ 21:49  Organism --  Gram Stain Blood -- Gram Stain --  Specimen Source .Blood None  Culture-Blood --        LABS: All Labs Reviewed:                        12.9   20.6  )-----------( 366      ( 16 Feb 2017 06:51 )             38.8         LABS: All Labs Reviewed:                        13.0   19.8  )-----------( 380      ( 14 Feb 2017 11:44 )             39.1     14 Feb 2017 11:44    142    |  110    |  10     ----------------------------<  154    4.2     |  23     |  0.76     Ca    8.6        14 Feb 2017 11:44    TPro  7.8    /  Alb  3.4    /  TBili  0.3    /  DBili  x      /  AST  34     /  ALT  43     /  AlkPhos  110    13 Feb 2017 20:56                  Rapid Respiratory Viral Panel (02.13.17 @ 20:56)   Rapid RVP Result: Detected: The FilmArray RVP Rapid uses polymerase chain reaction (PCR) and melt curve analysis to screen for adenovirus; coronavirus HKU1, NL63, 229E, OC43; human metapneumovirus (hMPV); human enterovirus/rhinovirus (Entero/RV); influenza A; influenza A/H1;Detected: influenza A/H3; influenza A/H1-2009; influenza B; parainfluenza viruses 1, 2, 3, 4; respiratory syncytial virus; Bordetella pertussis; Mycoplasma pneumoniae; and Chlamydophila pneumoniae.   Resp Syncytial Virus (RapRVP): Detected: Called to Nurse/Doctor. BRIDGER Barboza RN 02/13/17 23:30    EXAM:  CT CHEST                       02/13/2017  - no focal consolidation

## 2017-02-17 NOTE — PROGRESS NOTE ADULT - ASSESSMENT
31y female admitted with    *asthma exacerbation, +RSV URI  - failed outpt high dose po steroids  - continue IV steroids--> taper and repeat cbc   - isolation  - bronchodilators  - cough suppressants  - Pulm consult appreciated     *leukocytosis  - likely due to recent high dose steroids outpatient  - taper steroids  - repeat cbc w/ diff  - afebrile, no infiltrate on CT chest    *migraines  - topamax and home med    *hypokalemia  - repleted    *dvt px  - scds, ambulate     Possible d/c planning tomorrow if Sx improve. 31y female admitted with    *asthma exacerbation, +RSV URI  - failed outpt high dose po steroids  - continue IV steroids--> taper and repeat cbc   - isolation  - bronchodilators  - cough suppressants  - Pulm consult appreciated     *leukocytosis  - likely due to recent high dose steroids outpatient  - taper steroids  - repeat cbc w/ diff  - afebrile, no infiltrate on CT chest    *migraines  - topamax and home med    *hypokalemia  - repleted    *dvt px  - scds, ambulate     Possible d/c planning tomorrow if Sx improve.  Pulm to determine steroid taper.

## 2017-02-18 VITALS — OXYGEN SATURATION: 97 %

## 2017-02-18 LAB
BASOPHILS # BLD AUTO: 0 K/UL — SIGNIFICANT CHANGE UP (ref 0–0.2)
BASOPHILS NFR BLD AUTO: 0.2 % — SIGNIFICANT CHANGE UP (ref 0–2)
EOSINOPHIL # BLD AUTO: 0 K/UL — SIGNIFICANT CHANGE UP (ref 0–0.5)
EOSINOPHIL NFR BLD AUTO: 0 % — SIGNIFICANT CHANGE UP (ref 0–6)
HCT VFR BLD CALC: 38.6 % — SIGNIFICANT CHANGE UP (ref 34.5–45)
HGB BLD-MCNC: 12.7 G/DL — SIGNIFICANT CHANGE UP (ref 11.5–15.5)
LYMPHOCYTES # BLD AUTO: 1.7 K/UL — SIGNIFICANT CHANGE UP (ref 1–3.3)
LYMPHOCYTES # BLD AUTO: 8.1 % — LOW (ref 13–44)
MCHC RBC-ENTMCNC: 28.9 PG — SIGNIFICANT CHANGE UP (ref 27–34)
MCHC RBC-ENTMCNC: 33 GM/DL — SIGNIFICANT CHANGE UP (ref 32–36)
MCV RBC AUTO: 87.5 FL — SIGNIFICANT CHANGE UP (ref 80–100)
MONOCYTES # BLD AUTO: 0.9 K/UL — SIGNIFICANT CHANGE UP (ref 0–0.9)
MONOCYTES NFR BLD AUTO: 4.1 % — SIGNIFICANT CHANGE UP (ref 2–14)
NEUTROPHILS # BLD AUTO: 18.5 K/UL — HIGH (ref 1.8–7.4)
NEUTROPHILS NFR BLD AUTO: 87.6 % — HIGH (ref 43–77)
PLATELET # BLD AUTO: 361 K/UL — SIGNIFICANT CHANGE UP (ref 150–400)
RBC # BLD: 4.41 M/UL — SIGNIFICANT CHANGE UP (ref 3.8–5.2)
RBC # FLD: 12.9 % — SIGNIFICANT CHANGE UP (ref 10.3–14.5)
WBC # BLD: 21.1 K/UL — HIGH (ref 3.8–10.5)
WBC # FLD AUTO: 21.1 K/UL — HIGH (ref 3.8–10.5)

## 2017-02-18 RX ADMIN — Medication 20 MILLIGRAM(S): at 15:44

## 2017-02-18 RX ADMIN — Medication 3 MILLILITER(S): at 00:28

## 2017-02-18 RX ADMIN — Medication 3 MILLILITER(S): at 14:34

## 2017-02-18 RX ADMIN — Medication 1 TABLET(S): at 10:59

## 2017-02-18 RX ADMIN — Medication 20 MILLIGRAM(S): at 03:46

## 2017-02-18 RX ADMIN — Medication 20 MILLIGRAM(S): at 10:53

## 2017-02-18 RX ADMIN — PANTOPRAZOLE SODIUM 40 MILLIGRAM(S): 20 TABLET, DELAYED RELEASE ORAL at 06:43

## 2017-02-18 RX ADMIN — Medication 0.5 MILLIGRAM(S): at 08:34

## 2017-02-18 RX ADMIN — Medication 3 MILLILITER(S): at 08:32

## 2017-02-18 RX ADMIN — Medication 3 MILLILITER(S): at 03:00

## 2017-02-18 NOTE — DISCHARGE NOTE ADULT - PATIENT PORTAL LINK FT
“You can access the Follow[facility] Patient Portal, offered by [facility], by registering with the following website: [email or web address]”

## 2017-02-18 NOTE — DISCHARGE NOTE ADULT - MEDICATION SUMMARY - MEDICATIONS TO TAKE
I will START or STAY ON the medications listed below when I get home from the hospital:    predniSONE  -- 20 milligram(s) by mouth 3 times a day -for bronchospasm  -- Indication: For Asthma    Relpax 40 mg oral tablet  -- 1 tab(s) by mouth once a day, As Needed  -- for oset of migrane  -- Indication: For headache    Topamax 25 mg oral tablet  -- 2 tab(s) by mouth once a day (at bedtime)  -- Indication: For headache    Symbicort 80 mcg-4.5 mcg/inh inhalation aerosol  -- 2 puff(s) inhaled 2 times a day  -- Indication: For Asthma    albuterol 90 mcg/inh inhalation aerosol  -- 2 puff(s) inhaled 4 times a day  -- Indication: For Asthma    cefuroxime 500 mg oral tablet  -- 1 tab(s) by mouth 2 times a day  -- Indication: For bronchitis    Astepro  -- 2 spray(s) into nose 2 times a day  -- Indication: For Rhinitis    Probiotic Formula oral capsule  -- 2 cap(s) by mouth 2 times a day  -- Indication: For yeast infection    omeprazole 20 mg oral delayed release capsule  -- 1 cap(s) by mouth once a day  -- Indication: For Acid reflux    Robitussin CF  -- 20 milliliter(s) by mouth every 4 hours, As Needed  -- Indication: For Cough    Robitussin Allergy & Cough  -- 10 milliliter(s) by mouth once a day (at bedtime)  -- Indication: For Cough

## 2017-02-18 NOTE — DISCHARGE NOTE ADULT - CARE PLAN
Principal Discharge DX:	Asthma with acute exacerbation, unspecified asthma severity  Goal:	improved breathing  Instructions for follow-up, activity and diet:	cont inhalers  prednisone 20mg tid x 3days; bid x 3 days; daily x 3 days  Secondary Diagnosis:	Chiari malformation type I  Secondary Diagnosis:	Nonintractable migraine, unspecified migraine type  Secondary Diagnosis:	PCOS (polycystic ovarian syndrome)  Secondary Diagnosis:	RSV infection

## 2017-02-18 NOTE — DISCHARGE NOTE ADULT - CARE PROVIDER_API CALL
Roque Smith), Internal Medicine; Pulmonary Disease  [address] [place] [address]  Phone: [phone]  Fax: [phone] Roque Smith), Internal Medicine; Pulmonary Disease  [address] [place] [address]  Phone: [phone]  Fax: [phone]

## 2017-02-18 NOTE — DISCHARGE NOTE ADULT - SECONDARY DIAGNOSIS.
Chiari malformation type I Nonintractable migraine, unspecified migraine type PCOS (polycystic ovarian syndrome) RSV infection

## 2017-02-18 NOTE — PROGRESS NOTE ADULT - ASSESSMENT
PROBLEMS;    Asthma exacerbation  +RSV  Tracheitis  leukocytosis-due to recent high dose steroids-afebrile-no infiltrate on CT chest  Migraines    PLAN;    po prednisone  aerosol  discharge planning  DVT prophylasix

## 2017-02-18 NOTE — DISCHARGE NOTE ADULT - HOSPITAL COURSE
31y female w/ PMH PCOS, migraines, asthma presents to ED w/ worsening cough, wheezing and dyspnea over past 5days.  Pt states she has been experiencing similar sx, but less severe, since early January.  She has followed up with her Pulmonologist multiple times over past 2 months and was started on a second high dose steroid taper last week as well as Ceftin.   Wheezing and sob worsened over weekend requiring her to use entire rescue inhaler without relief.  Cough nonproductive.  Sob only w/ cough.  Denies chest pain, fevers, n/v/d, abd pain.  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  No recent travel.  Has had ER visits in past for asthma exacerbation but never required admission.   2/18; much improved    Vital Signs Last 24 Hrs  T(C): 36.9, Max: 36.9 (02-18 @ 12:56)  T(F): 98.5, Max: 98.5 (02-18 @ 12:56)  HR: 75 (62 - 82)  BP: 120/71 (119/83 - 126/73)  BP(mean): --  RR: 20 (18 - 22)  SpO2: 97% (94% - 97%)    NECK:  Supple. No lymphadenopathy. Jugular venous pressure not elevated. Carotids equal.   HEART:   The cardiac impulse has a normal quality. Reg., Nl S1 and S2.  There are no murmurs, rubs or gallops noted  CHEST:  Chest is clear to auscultation. Normal respiratory effort.  ABDOMEN:  Soft and nontender.   EXTREMITIES:  There is no edema.

## 2017-02-18 NOTE — PROGRESS NOTE ADULT - SUBJECTIVE AND OBJECTIVE BOX
Subjective:    pat better, no new complaint.    MEDICATIONS  (STANDING):  topiramate 50milliGRAM(s) Oral at bedtime  HYDROcodone/homatropine Syrup 5milliLiter(s) Oral four times a day  ALBUTerol/ipratropium for Nebulization 3milliLiter(s) Nebulizer every 4 hours  ALBUTerol    90 MICROgram(s) HFA Inhaler 1Puff(s) Inhalation every 4 hours  pantoprazole    Tablet 40milliGRAM(s) Oral before breakfast  lactobacillus acidophilus 1Tablet(s) Oral two times a day with meals  buDESOnide   0.5 milliGRAM(s) Respule 0.5milliGRAM(s) Inhalation every 12 hours  methylPREDNISolone sodium succinate Injectable 20milliGRAM(s) IV Push every 6 hours    MEDICATIONS  (PRN):  freetext medication  - 40milliGRAM(s) Oral <User Schedule> PRN for headache      Allergies    No Known Allergies    Intolerances        Vital Signs Last 24 Hrs  T(C): 36.3, Max: 36.4 (02-17 @ 15:32)  T(F): 97.3, Max: 97.6 (02-17 @ 15:32)  HR: 74 (62 - 82)  BP: 119/83 (119/83 - 126/73)  BP(mean): --  RR: 22 (18 - 22)  SpO2: 96% (94% - 97%)    PHYSICAL EXAMINATION:    NECK:  Supple. No lymphadenopathy. Jugular venous pressure not elevated. Carotids equal.   HEART:   The cardiac impulse has a normal quality. Reg., Nl S1 and S2.  There are no murmurs, rubs or gallops noted  CHEST:  Chest is clear to auscultation. Normal respiratory effort.  ABDOMEN:  Soft and nontender.   EXTREMITIES:  There is no edema.       LABS:                        12.7   21.1  )-----------( 361      ( 18 Feb 2017 06:42 )             38.6

## 2017-02-19 LAB
CULTURE RESULTS: SIGNIFICANT CHANGE UP
SPECIMEN SOURCE: SIGNIFICANT CHANGE UP

## 2017-02-23 DIAGNOSIS — D72.829 ELEVATED WHITE BLOOD CELL COUNT, UNSPECIFIED: ICD-10-CM

## 2017-02-23 DIAGNOSIS — J45.901 UNSPECIFIED ASTHMA WITH (ACUTE) EXACERBATION: ICD-10-CM

## 2017-02-23 DIAGNOSIS — G93.5 COMPRESSION OF BRAIN: ICD-10-CM

## 2017-02-23 DIAGNOSIS — G43.009 MIGRAINE WITHOUT AURA, NOT INTRACTABLE, WITHOUT STATUS MIGRAINOSUS: ICD-10-CM

## 2017-02-23 DIAGNOSIS — E87.6 HYPOKALEMIA: ICD-10-CM

## 2017-02-23 DIAGNOSIS — E28.2 POLYCYSTIC OVARIAN SYNDROME: ICD-10-CM

## 2017-02-23 DIAGNOSIS — B97.4 RESPIRATORY SYNCYTIAL VIRUS AS THE CAUSE OF DISEASES CLASSIFIED ELSEWHERE: ICD-10-CM

## 2017-03-16 PROBLEM — E28.2 POLYCYSTIC OVARIAN SYNDROME: Chronic | Status: ACTIVE | Noted: 2017-02-14

## 2017-03-16 PROBLEM — G93.5 COMPRESSION OF BRAIN: Chronic | Status: ACTIVE | Noted: 2017-02-14

## 2017-03-16 PROBLEM — J45.909 UNSPECIFIED ASTHMA, UNCOMPLICATED: Chronic | Status: ACTIVE | Noted: 2017-02-14

## 2017-03-16 PROBLEM — G43.009 MIGRAINE WITHOUT AURA, NOT INTRACTABLE, WITHOUT STATUS MIGRAINOSUS: Chronic | Status: ACTIVE | Noted: 2017-02-14

## 2017-03-30 ENCOUNTER — MEDICATION RENEWAL (OUTPATIENT)
Age: 32
End: 2017-03-30

## 2017-04-03 ENCOUNTER — APPOINTMENT (OUTPATIENT)
Dept: INTERNAL MEDICINE | Facility: CLINIC | Age: 32
End: 2017-04-03

## 2017-04-03 ENCOUNTER — NON-APPOINTMENT (OUTPATIENT)
Age: 32
End: 2017-04-03

## 2017-04-03 VITALS
TEMPERATURE: 97.6 F | WEIGHT: 236 LBS | SYSTOLIC BLOOD PRESSURE: 100 MMHG | OXYGEN SATURATION: 96 % | HEART RATE: 86 BPM | DIASTOLIC BLOOD PRESSURE: 58 MMHG | BODY MASS INDEX: 41.82 KG/M2 | HEIGHT: 63 IN | RESPIRATION RATE: 20 BRPM

## 2017-04-15 ENCOUNTER — RECORD ABSTRACTING (OUTPATIENT)
Age: 32
End: 2017-04-15

## 2017-04-15 DIAGNOSIS — Z78.9 OTHER SPECIFIED HEALTH STATUS: ICD-10-CM

## 2017-04-15 DIAGNOSIS — Z83.49 FAMILY HISTORY OF OTHER ENDOCRINE, NUTRITIONAL AND METABOLIC DISEASES: ICD-10-CM

## 2017-04-15 DIAGNOSIS — Z82.49 FAMILY HISTORY OF ISCHEMIC HEART DISEASE AND OTHER DISEASES OF THE CIRCULATORY SYSTEM: ICD-10-CM

## 2017-04-21 ENCOUNTER — RECORD ABSTRACTING (OUTPATIENT)
Age: 32
End: 2017-04-21

## 2017-04-24 ENCOUNTER — APPOINTMENT (OUTPATIENT)
Dept: INTERNAL MEDICINE | Facility: CLINIC | Age: 32
End: 2017-04-24

## 2017-04-24 ENCOUNTER — NON-APPOINTMENT (OUTPATIENT)
Age: 32
End: 2017-04-24

## 2017-04-24 VITALS
BODY MASS INDEX: 43.23 KG/M2 | TEMPERATURE: 97.5 F | OXYGEN SATURATION: 98 % | RESPIRATION RATE: 18 BRPM | HEIGHT: 63 IN | HEART RATE: 80 BPM | DIASTOLIC BLOOD PRESSURE: 82 MMHG | WEIGHT: 243.99 LBS | SYSTOLIC BLOOD PRESSURE: 104 MMHG

## 2017-05-08 ENCOUNTER — APPOINTMENT (OUTPATIENT)
Dept: INTERNAL MEDICINE | Facility: CLINIC | Age: 32
End: 2017-05-08

## 2017-05-16 ENCOUNTER — MEDICATION RENEWAL (OUTPATIENT)
Age: 32
End: 2017-05-16

## 2017-07-20 ENCOUNTER — RX RENEWAL (OUTPATIENT)
Age: 32
End: 2017-07-20

## 2017-10-03 RX ORDER — BUDESONIDE AND FORMOTEROL FUMARATE DIHYDRATE 160; 4.5 UG/1; UG/1
160-4.5 AEROSOL RESPIRATORY (INHALATION) TWICE DAILY
Qty: 1 | Refills: 5 | Status: DISCONTINUED | COMMUNITY
Start: 2017-04-03 | End: 2017-10-03

## 2018-02-05 ENCOUNTER — RX RENEWAL (OUTPATIENT)
Age: 33
End: 2018-02-05

## 2018-03-29 NOTE — PATIENT PROFILE ADULT. - PRIMARY CARE PHYSICIAN, PROFILE
Patient has contacted the office so we can specify the need of her chair due to to her thoracolumbar scoliosis, for sitting at work to alleviate and decrease the pain during her prolonged sitting, we can provide her with a diagnosis of scoliosis and the need for the chair at the specification of the chair that she need it need to be done by occupational therapy evaluation, as per patient and her company that they do not have an occupational health department so we will refer her to occupational health to seek further assistance for specification of the chair that she need. Dr. Gustavo vargas

## 2018-04-13 ENCOUNTER — APPOINTMENT (OUTPATIENT)
Dept: INTERNAL MEDICINE | Facility: CLINIC | Age: 33
End: 2018-04-13
Payer: COMMERCIAL

## 2018-04-13 VITALS — HEART RATE: 85 BPM | RESPIRATION RATE: 16 BRPM | DIASTOLIC BLOOD PRESSURE: 74 MMHG | SYSTOLIC BLOOD PRESSURE: 124 MMHG

## 2018-04-13 VITALS
TEMPERATURE: 98.5 F | BODY MASS INDEX: 41.46 KG/M2 | HEIGHT: 63 IN | HEART RATE: 85 BPM | RESPIRATION RATE: 16 BRPM | DIASTOLIC BLOOD PRESSURE: 74 MMHG | WEIGHT: 234 LBS | OXYGEN SATURATION: 98 % | SYSTOLIC BLOOD PRESSURE: 124 MMHG

## 2018-04-13 PROCEDURE — 94727 GAS DIL/WSHOT DETER LNG VOL: CPT

## 2018-04-13 PROCEDURE — ZZZZZ: CPT

## 2018-04-13 PROCEDURE — 94060 EVALUATION OF WHEEZING: CPT

## 2018-04-13 PROCEDURE — 94729 DIFFUSING CAPACITY: CPT

## 2018-04-13 PROCEDURE — 99215 OFFICE O/P EST HI 40 MIN: CPT | Mod: 25

## 2018-04-13 RX ORDER — FLUTICASONE PROPIONATE AND SALMETEROL XINAFOATE 230; 21 UG/1; UG/1
230-21 AEROSOL, METERED RESPIRATORY (INHALATION)
Qty: 36 | Refills: 3 | Status: DISCONTINUED | COMMUNITY
Start: 2017-10-03 | End: 2018-04-13

## 2018-04-13 RX ORDER — PREDNISONE 10 MG/1
10 TABLET ORAL DAILY
Refills: 0 | Status: DISCONTINUED | COMMUNITY
End: 2018-04-13

## 2018-04-13 RX ORDER — CODEINE PHOSPHATE AND GUAIFENESIN 10; 100 MG/5ML; MG/5ML
100-10 LIQUID ORAL
Qty: 100 | Refills: 0 | Status: DISCONTINUED | COMMUNITY
Start: 2017-03-30 | End: 2018-04-13

## 2018-04-13 RX ORDER — PREDNISONE 20 MG/1
20 TABLET ORAL DAILY
Refills: 0 | Status: DISCONTINUED | COMMUNITY
End: 2018-04-13

## 2018-04-13 RX ORDER — AZELASTINE HYDROCHLORIDE 205.5 UG/1
0.15 SPRAY, METERED NASAL TWICE DAILY
Refills: 0 | Status: DISCONTINUED | COMMUNITY
End: 2018-04-13

## 2018-07-13 ENCOUNTER — APPOINTMENT (OUTPATIENT)
Dept: INTERNAL MEDICINE | Facility: CLINIC | Age: 33
End: 2018-07-13
Payer: COMMERCIAL

## 2018-07-13 ENCOUNTER — NON-APPOINTMENT (OUTPATIENT)
Age: 33
End: 2018-07-13

## 2018-07-13 VITALS
RESPIRATION RATE: 18 BRPM | HEART RATE: 86 BPM | HEIGHT: 63 IN | OXYGEN SATURATION: 97 % | DIASTOLIC BLOOD PRESSURE: 60 MMHG | SYSTOLIC BLOOD PRESSURE: 98 MMHG | WEIGHT: 242 LBS | TEMPERATURE: 98.2 F | BODY MASS INDEX: 42.88 KG/M2

## 2018-07-13 DIAGNOSIS — E88.81 METABOLIC SYNDROME: ICD-10-CM

## 2018-07-13 DIAGNOSIS — Z86.19 PERSONAL HISTORY OF OTHER INFECTIOUS AND PARASITIC DISEASES: ICD-10-CM

## 2018-07-13 PROCEDURE — 94060 EVALUATION OF WHEEZING: CPT

## 2018-07-13 PROCEDURE — 99214 OFFICE O/P EST MOD 30 MIN: CPT | Mod: 25

## 2018-07-13 RX ORDER — OMEGA-3/DHA/EPA/FISH OIL 300-1000MG
CAPSULE ORAL
Refills: 0 | Status: ACTIVE | COMMUNITY

## 2018-07-13 RX ORDER — BLOOD SUGAR DIAGNOSTIC
STRIP MISCELLANEOUS
Qty: 50 | Refills: 0 | Status: ACTIVE | COMMUNITY
Start: 2018-02-26

## 2018-07-13 RX ORDER — CHOLECALCIFEROL (VITAMIN D3) 25 MCG
TABLET ORAL
Refills: 0 | Status: ACTIVE | COMMUNITY

## 2018-07-13 RX ORDER — TOPIRAMATE 25 MG/1
25 TABLET, FILM COATED ORAL
Qty: 90 | Refills: 0 | Status: DISCONTINUED | COMMUNITY
Start: 2017-12-08 | End: 2018-07-13

## 2018-07-13 NOTE — COUNSELING
[Healthy eating counseling provided] : healthy eating [Activity counseling provided] : activity [Walking] : Walking [Good understanding] : Patient has a good understanding of lifestyle changes and the steps needed to achieve self management goals [de-identified] : GERD restrictions reviewed and education sheet provided.

## 2018-07-13 NOTE — HISTORY OF PRESENT ILLNESS
[FreeTextEntry1] : Asthma in pregnancy. [de-identified] : The patient has a planned  for early October with Dr Herrera at [facility]. She reports stable respiratory status with the exception of exertional dyspnea. She is "out of breath" climbing a flight of stairs but resolves within 1-2 min with rest. She denies exertional cough, wheeze or chest pain and has not used rescue inhaler. Dr Ring is in the process of cardiac work up for intermittent palpitations including echo and Holter. "Its been fine so far but I have a few extra beats." She has mild reflux and will be meeting with dietician soon. She sleeps well and denies orthopnea, PND or LE edema.

## 2018-07-13 NOTE — PLAN
[FreeTextEntry1] : She will continue rescue inhaler PRN and call if used BID PRN for 2 days as this signifies lack of asthma control.\par Concerns about asthma in pregnancy reviewed with patient.\par She will start Pulmicort 180 as 2 puffs BID the 3rd week of August as asthma sxs typical worsen this time each year.\par Will consider addition of Singulair if asthma not controlled despite Pulmicort.\par GERD precautions reviewed.\par She will forward vaccine history and cardiology testing.\par She will continue scheduled f/u with Dr Ring, Dr Herrera and Dr Chen.\par F/U here in 2 mos as scheduled or sooner PRN.

## 2018-07-13 NOTE — REVIEW OF SYSTEMS
[Recent Change In Weight] : ~T recent weight change [Vision Problems] : vision problems [Palpitations] : palpitations [Dyspnea on Exertion] : dyspnea on exertion [Heartburn] : heartburn [Negative] : Heme/Lymph [Fever] : no fever [Chills] : no chills [Fatigue] : no fatigue [Night Sweats] : no night sweats [Earache] : no earache [Nosebleed] : no nosebleeds [Hoarseness] : no hoarseness [Nasal Discharge] : no nasal discharge [Sore Throat] : no sore throat [Chest Pain] : no chest pain [Leg Claudication] : no leg claudication [Lower Ext Edema] : no lower extremity edema [Orthopnea] : no orthopnea [Paroysmal Nocturnal Dyspnea] : no paroysmal nocturnal dyspnea [Wheezing] : no wheezing [Cough] : no cough [Nausea] : no nausea [Vomiting] : no vomiting [Melena] : no melena [Joint Pain] : no joint pain [Joint Stiffness] : no joint stiffness [Joint Swelling] : no joint swelling [Muscle Pain] : no muscle pain [Back Pain] : no back pain [Itching] : no itching [Skin Rash] : no skin rash [Headache] : no headache [Dizziness] : no dizziness [Fainting] : no fainting [Memory Loss] : no memory loss [Unsteady Walking] : no ataxia [Anxiety] : no anxiety [Depression] : no depression [Easy Bruising] : no easy bruising [Swollen Glands] : no swollen glands [FreeTextEntry3] : wears glasses

## 2018-07-13 NOTE — PHYSICAL EXAM
[Speech Grossly Normal] : speech grossly normal [Memory Grossly Normal] : memory grossly normal [Normal Affect] : the affect was normal [Alert and Oriented x3] : oriented to person, place, and time [Normal Mood] : the mood was normal [Normal Insight/Judgement] : insight and judgment were intact [General Appearance - Alert] : alert [General Appearance - In No Acute Distress] : in no acute distress [General Appearance - Well Developed] : well developed [General Appearance - Well-Appearing] : healthy appearing [Sclera] : the sclera and conjunctiva were normal [PERRL With Normal Accommodation] : pupils were equal in size, round, and reactive to light [Strabismus] : no strabismus was seen [Outer Ear] : the ears and nose were normal in appearance [Examination Of The Oral Cavity] : the lips and gums were normal [Oropharynx] : the oropharynx was normal [Neck Appearance] : the appearance of the neck was normal [Neck Cervical Mass (___cm)] : no neck mass was observed [Jugular Venous Distention Increased] : there was no jugular-venous distention [Thyroid Diffuse Enlargement] : the thyroid was not enlarged [] : no respiratory distress [Respiration, Rhythm And Depth] : normal respiratory rhythm and effort [Exaggerated Use Of Accessory Muscles For Inspiration] : no accessory muscle use [Auscultation Breath Sounds / Voice Sounds] : lungs were clear to auscultation bilaterally [Heart Rate And Rhythm] : heart rate was normal and rhythm regular [Heart Sounds] : normal S1 and S2 [Heart Sounds Gallop] : no gallops [Systolic grade ___/6] : A grade [unfilled]/6 systolic murmur was heard. [Edema] : there was no peripheral edema [Veins - Varicosity Changes] : there were no varicosital changes [Cervical Lymph Nodes Enlarged Anterior Bilaterally] : anterior cervical [Supraclavicular Lymph Nodes Enlarged Bilaterally] : supraclavicular [Abnormal Walk] : normal gait [Nail Clubbing] : no clubbing  or cyanosis of the fingernails [Musculoskeletal - Swelling] : no joint swelling seen [FreeTextEntry1] : wearing glasses

## 2018-07-13 NOTE — DATA REVIEWED
[FreeTextEntry1] : A pre-and post spirogram was performed today. This reveals normal flow rates with mild airway reactivity.\par Cardiology records not available for review.\par

## 2018-08-06 ENCOUNTER — NON-APPOINTMENT (OUTPATIENT)
Age: 33
End: 2018-08-06

## 2018-08-06 ENCOUNTER — APPOINTMENT (OUTPATIENT)
Dept: INTERNAL MEDICINE | Facility: CLINIC | Age: 33
End: 2018-08-06
Payer: COMMERCIAL

## 2018-08-06 VITALS
BODY MASS INDEX: 42.35 KG/M2 | HEIGHT: 63 IN | RESPIRATION RATE: 20 BRPM | OXYGEN SATURATION: 97 % | DIASTOLIC BLOOD PRESSURE: 70 MMHG | WEIGHT: 239 LBS | HEART RATE: 86 BPM | TEMPERATURE: 97.8 F | SYSTOLIC BLOOD PRESSURE: 102 MMHG

## 2018-08-06 PROCEDURE — 99496 TRANSJ CARE MGMT HIGH F2F 7D: CPT

## 2018-08-06 PROCEDURE — 94060 EVALUATION OF WHEEZING: CPT

## 2018-08-06 NOTE — PHYSICAL EXAM
[No Acute Distress] : no acute distress [Well Nourished] : well nourished [Normal Sclera/Conjunctiva] : normal sclera/conjunctiva [PERRL] : pupils equal round and reactive to light [Normal Oropharynx] : the oropharynx was normal [Normal TMs] : both tympanic membranes were normal [Supple] : supple [No Respiratory Distress] : no respiratory distress  [Normal Rate] : normal rate  [Regular Rhythm] : with a regular rhythm [Normal S1, S2] : normal S1 and S2 [No Edema] : there was no peripheral edema [Normal Posterior Cervical Nodes] : no posterior cervical lymphadenopathy [Normal Anterior Cervical Nodes] : no anterior cervical lymphadenopathy [Grossly Normal Strength/Tone] : grossly normal strength/tone [No Rash] : no rash [No Focal Deficits] : no focal deficits [Normal Affect] : the affect was normal [Normal Insight/Judgement] : insight and judgment were intact [de-identified] : expiratory wheeze bilateral upper lobes.

## 2018-08-06 NOTE — ASSESSMENT
[FreeTextEntry1] : Exacerbation of moderate persistent asthma\par Heartburn\par Pregnancy, 28 weeks.

## 2018-08-06 NOTE — REVIEW OF SYSTEMS
[Fatigue] : fatigue [Wheezing] : wheezing [Cough] : no cough [Abdominal Pain] : no abdominal pain [Vomiting] : no vomiting [Heartburn] : heartburn [Frequency] : frequency [Negative] : Psychiatric [FreeTextEntry8] : secondary to pregnancy.

## 2018-08-06 NOTE — PLAN
[FreeTextEntry1] : Prednisone 20 mg tabs:\par 1 tab TID for 2 days then 1 tab BID for 4 days PC\par Called in hand held nebulizer for Albuterol Sulfate (2.5 mg/3ML) to be used every 4-6 hours prn, for improved absorption.\par Continue Pulmicort Flexhaler 180 MCG 2 puffs twice daily.Continue all other medications as prescribed.\par Call office if no improvement/resolve.\par To ED for emergent symptoms.\par

## 2018-08-06 NOTE — HISTORY OF PRESENT ILLNESS
[FreeTextEntry8] : Patient presents for evaluation post ED visit at [facility].  Her  has been ill with URI for 1 week.  Saturday she awoke wheezing and coughing.  She used her Ventolin inhaler x 2 with modest relief.  Started herself on Pulmicort BID.  Denied sputum production, chest pains or fevers. Called her OB  morning and was instructed to start Robitussin DM, patient is scheduled for  with her first child .\par Called OB again early  evening because she was still wheezing so was instructed to present to ED.  There she was given 2 nebulizer treatments and 60 mg oral prednisone at 10:10 PM.  After being observed, sent home with a prescription for prednisone 20 mg tabs to be taken 1 tab BID for 5 days.  Patient took an extra 20 mg Prednisone tablet when she arrived home at 2:40 AM this morning, she felt she was still wheezing.\par Patient historically restarts Pulmicort August through March every year for "her bad seasons."\par Today feeling improved although reports wheeze.  Cough is rare and nonproductive.  Still denies fevers. Took Ventolin inhaler and Pulmicort this AM.\par She did start Lansoprazole after last visit to our office for heartburn which was becoming progressively worse as abdominal girth increases from her pregnancy.  She finds it helpful in relieving symptoms.\par

## 2018-08-10 ENCOUNTER — APPOINTMENT (OUTPATIENT)
Dept: INTERNAL MEDICINE | Facility: CLINIC | Age: 33
End: 2018-08-10
Payer: COMMERCIAL

## 2018-08-10 ENCOUNTER — NON-APPOINTMENT (OUTPATIENT)
Age: 33
End: 2018-08-10

## 2018-08-10 VITALS
BODY MASS INDEX: 42.52 KG/M2 | HEART RATE: 96 BPM | TEMPERATURE: 98 F | HEIGHT: 63 IN | DIASTOLIC BLOOD PRESSURE: 70 MMHG | OXYGEN SATURATION: 98 % | RESPIRATION RATE: 16 BRPM | WEIGHT: 240 LBS | SYSTOLIC BLOOD PRESSURE: 110 MMHG

## 2018-08-10 PROCEDURE — 99214 OFFICE O/P EST MOD 30 MIN: CPT | Mod: 25

## 2018-08-10 PROCEDURE — 94060 EVALUATION OF WHEEZING: CPT

## 2018-08-10 RX ORDER — AMOXICILLIN 500 MG/1
500 CAPSULE ORAL 3 TIMES DAILY
Qty: 21 | Refills: 0 | Status: COMPLETED | COMMUNITY
Start: 2018-08-10 | End: 2018-08-17

## 2018-08-10 NOTE — DATA REVIEWED
[FreeTextEntry1] : \par Pre/post shows mild restriction with minimal airway reactivity.  \par \par FSBS 108

## 2018-08-10 NOTE — ASSESSMENT
[FreeTextEntry1] : \par \par Pt evaluated and plan reviewed with .\par Dr. Dunn consulted and case reviewed.   Codeine is perfectly exceptable to use for pt.  If necessary a CXR may be obtained with shielding.  \par Continue Pulmicort 2 inh bid\par Singulair Qd\par Albuterol nebs q 4-6 hrs \par No work advised. \par Prednisone 40 mg qd until re-evaluation here 8/16.  Closely monitor BS while on steroids.  She must consult with endocrine for any hyperglycemia.  \par Robitussin AC q 6hrs prn.\par Pt strongly advised of need for ED evaluation for any decompensation in status.  She reports understanding.  \par

## 2018-08-10 NOTE — PHYSICAL EXAM
[No Acute Distress] : no acute distress [Well Nourished] : well nourished [Normal Sclera/Conjunctiva] : normal sclera/conjunctiva [PERRL] : pupils equal round and reactive to light [Normal Oropharynx] : the oropharynx was normal [Normal TMs] : both tympanic membranes were normal [Supple] : supple [No Lymphadenopathy] : no lymphadenopathy [No Respiratory Distress] : no respiratory distress  [No Accessory Muscle Use] : no accessory muscle use [Normal Rate] : normal rate  [Regular Rhythm] : with a regular rhythm [Normal S1, S2] : normal S1 and S2 [No Edema] : there was no peripheral edema [Normal Posterior Cervical Nodes] : no posterior cervical lymphadenopathy [Normal Anterior Cervical Nodes] : no anterior cervical lymphadenopathy [Grossly Normal Strength/Tone] : grossly normal strength/tone [No Rash] : no rash [No Focal Deficits] : no focal deficits [Normal Affect] : the affect was normal [Normal Insight/Judgement] : insight and judgment were intact [Well-Appearing] : well-appearing [Normal Voice/Communication] : normal voice/communication [No Extremity Clubbing/Cyanosis] : no extremity clubbing/cyanosis [de-identified] : +maxillary tenderness BL Nares erythematous [de-identified] : R anterior exp wheeze and trace wheezing on forced expiration.  Good air entry BL

## 2018-08-10 NOTE — REVIEW OF SYSTEMS
[Fatigue] : fatigue [Wheezing] : wheezing [Heartburn] : heartburn [Frequency] : frequency [Negative] : Psychiatric [Cough] : no cough [Abdominal Pain] : no abdominal pain [Vomiting] : no vomiting [FreeTextEntry8] : secondary to pregnancy.

## 2018-08-10 NOTE — HISTORY OF PRESENT ILLNESS
[FreeTextEntry1] : FU Asthma\par  [de-identified] : Pt having asthma exacerbation for the last week.  She is 31 weeks pregnant.  Had initial eval at [place] ED where alb nebs administered and pt started on prednisone 60 mg.  DC'd on Prednisone 40 mg bid x 5 days.  Was evaluated here next day feeling improved but still wheezing with instructions as below.      \par \par Regimine per 8/6/18\par \par Renew: ProAir  (90 Base) MCG/ACT Inhalation Aerosol Solution; INHALE 2\par PUFFS EVERY 6 HOURS AS NEEDED\par Prednisone 20 mg tabs:\par 1 tab TID for 2 days then 1 tab BID for 4 days PC\par Called in hand held nebulizer for Albuterol Sulfate (2.5 mg/3ML) to be used every 4-6 hours prn, for improved absorption.\par Continue Pulmicort Flexhaler 180 MCG 2 puffs twice daily.Continue all other medications as prescribed.\par \par Currently she is on day 3 of Prednisone 40mg.  Yesterday she had less wheeze and went to work  but had  increasing cough and PND.  Today persistent cough and wheeze but not as bad initially.  Cough is productive of thick white or yellow sputum.  She has sinus congestion with PND.  There have been no fevers or chills. She restarted singulair yesterday.  Took benadryl last night helped her sleep.     \par \par

## 2018-08-15 ENCOUNTER — CLINICAL ADVICE (OUTPATIENT)
Age: 33
End: 2018-08-15

## 2018-08-16 ENCOUNTER — NON-APPOINTMENT (OUTPATIENT)
Age: 33
End: 2018-08-16

## 2018-08-16 ENCOUNTER — APPOINTMENT (OUTPATIENT)
Dept: INTERNAL MEDICINE | Facility: CLINIC | Age: 33
End: 2018-08-16
Payer: COMMERCIAL

## 2018-08-16 VITALS
HEART RATE: 74 BPM | TEMPERATURE: 98.7 F | HEIGHT: 63 IN | RESPIRATION RATE: 16 BRPM | SYSTOLIC BLOOD PRESSURE: 102 MMHG | OXYGEN SATURATION: 98 % | BODY MASS INDEX: 42.17 KG/M2 | DIASTOLIC BLOOD PRESSURE: 80 MMHG | WEIGHT: 237.99 LBS

## 2018-08-16 PROCEDURE — 94010 BREATHING CAPACITY TEST: CPT

## 2018-08-16 PROCEDURE — 99214 OFFICE O/P EST MOD 30 MIN: CPT | Mod: 25

## 2018-08-16 NOTE — ASSESSMENT
[FreeTextEntry1] : Moderate persistent asthma flare, stabilizing.\par Sinusitis, resolving.\par 32 weeks pregnant.

## 2018-08-16 NOTE — PLAN
[FreeTextEntry1] : Complete Amoxicillin as prescribed (for a total of 10 day)\par Extend Prednisone to taper. 20 mg PO QD for 3 days then 10 mg PO QD daily for 3 days, PC\par Continue Pulmicort 2 inhalations BID, Singulair 10 mg QD, Benedryl HS and Robitussin AC sparingly.\par Albuteral now to be used PRN.\par She will continue blood sugar monitoring and Metformin dosing under the supervision of OB.\par Knows to call for any decompensation in status.\par To ED for emergent symptoms.\par \par Patient has been seen and examined by Dr. Smith today.\par

## 2018-08-16 NOTE — REVIEW OF SYSTEMS
[Night Sweats] : night sweats [Nasal Discharge] : nasal discharge [Postnasal Drip] : postnasal drip [Cough] : cough [Dyspnea on Exertion] : dyspnea on exertion [Negative] : Psychiatric [Fever] : no fever [Chills] : no chills [Hot Flashes] : no hot flashes [Hoarseness] : no hoarseness [Sore Throat] : no sore throat [Shortness Of Breath] : no shortness of breath [Wheezing] : no wheezing

## 2018-08-16 NOTE — PHYSICAL EXAM
[No Acute Distress] : no acute distress [Normal Sclera/Conjunctiva] : normal sclera/conjunctiva [PERRL] : pupils equal round and reactive to light [Supple] : supple [No Respiratory Distress] : no respiratory distress  [Clear to Auscultation] : lungs were clear to auscultation bilaterally [Normal Rate] : normal rate  [Regular Rhythm] : with a regular rhythm [Normal S1, S2] : normal S1 and S2 [Normal Posterior Cervical Nodes] : no posterior cervical lymphadenopathy [Normal Anterior Cervical Nodes] : no anterior cervical lymphadenopathy [No Rash] : no rash [No Focal Deficits] : no focal deficits [Normal Affect] : the affect was normal [Normal Insight/Judgement] : insight and judgment were intact [de-identified] : Cobblestoning posterior oropharynx on left, mild erythema.  No frontal sinus pressure.

## 2018-08-16 NOTE — HISTORY OF PRESENT ILLNESS
[FreeTextEntry8] : Presents in follow up.  She has history of moderate persistent asthma and first came to us post ED visit for flare to [facility] 10 days ago for improved, but continuing symptoms of cough and wheeze.  Please see prior notes. Patient is 32 weeks pregnant.\par Seen here again 6 days ago, less wheeze but increased cough and PND.  Started on Amoxicillin 500 mg TID (4 days left) and a new course of Prednisone, 20 mg tabs 1 tab BID (finishes tomorrow.) Was also prescribed Robitussin AC and Benadryl ( verified as OK by patient's OB.)  Reports that she uses Robitussin sparingly and gets good relief of congestion and drip with Benadryl.\par Today patient feels overall improved.  She still has occasional cough, productive of white sputum mostly in AM upon arising.  Denies wheeze.\par Taking Albuterol via HHN every 4 hours, as instructed during last visit.\par Metformin is now 1000 mg BID because of prednisone use.  Patient is being careful with diet and checking BS BID.

## 2018-08-29 ENCOUNTER — CLINICAL ADVICE (OUTPATIENT)
Age: 33
End: 2018-08-29

## 2018-09-07 ENCOUNTER — APPOINTMENT (OUTPATIENT)
Dept: INTERNAL MEDICINE | Facility: CLINIC | Age: 33
End: 2018-09-07
Payer: COMMERCIAL

## 2018-09-07 ENCOUNTER — NON-APPOINTMENT (OUTPATIENT)
Age: 33
End: 2018-09-07

## 2018-09-07 VITALS
SYSTOLIC BLOOD PRESSURE: 110 MMHG | HEART RATE: 98 BPM | BODY MASS INDEX: 44.3 KG/M2 | OXYGEN SATURATION: 97 % | WEIGHT: 250 LBS | DIASTOLIC BLOOD PRESSURE: 64 MMHG | TEMPERATURE: 97.5 F | RESPIRATION RATE: 18 BRPM | HEIGHT: 63 IN

## 2018-09-07 PROCEDURE — 96372 THER/PROPH/DIAG INJ SC/IM: CPT | Mod: 59,GA

## 2018-09-07 PROCEDURE — 99214 OFFICE O/P EST MOD 30 MIN: CPT | Mod: 25

## 2018-09-07 PROCEDURE — 94060 EVALUATION OF WHEEZING: CPT

## 2018-09-07 RX ORDER — CYCLOBENZAPRINE HYDROCHLORIDE 5 MG/1
5 TABLET, FILM COATED ORAL
Qty: 90 | Refills: 0 | Status: DISCONTINUED | COMMUNITY
Start: 2018-05-07 | End: 2018-09-07

## 2018-09-07 RX ORDER — PREDNISONE 20 MG/1
20 TABLET ORAL
Qty: 14 | Refills: 0 | Status: DISCONTINUED | COMMUNITY
Start: 2018-08-06 | End: 2018-09-07

## 2018-09-07 RX ORDER — PREDNISONE 20 MG/1
20 TABLET ORAL
Qty: 5 | Refills: 0 | Status: DISCONTINUED | COMMUNITY
Start: 2018-08-16 | End: 2018-09-07

## 2018-09-07 RX ORDER — PREDNISONE 20 MG/1
20 TABLET ORAL TWICE DAILY
Qty: 12 | Refills: 0 | Status: DISCONTINUED | COMMUNITY
Start: 2018-08-10 | End: 2018-09-07

## 2018-09-07 RX ORDER — TRIAMCINOLONE ACETONIDE 40 MG/ML
40 SUSPENSION INTRA-ARTERIAL; INTRAMUSCULAR
Qty: 6 | Refills: 0 | Status: COMPLETED | OUTPATIENT
Start: 2018-09-07

## 2018-09-07 RX ADMIN — TRIAMCINOLONE ACETONIDE 1.5 MG/ML: 40 INJECTION, SUSPENSION INTRA-ARTICULAR; INTRAMUSCULAR at 00:00

## 2018-09-07 NOTE — DATA REVIEWED
[FreeTextEntry1] : Spirometric analysis is within normal limits. Slight bronchodilator reactivity is demonstrated.

## 2018-09-07 NOTE — PLAN
[FreeTextEntry1] : 1. Continue his medication as outlined above.\par \par 2. Kenalog 60 mg IM x1 now.\par \par 3. Will reinstitute prednisone in a short versed in which she will take 40 mg for 3 days, followed by 20 mg for 3 days. By that time, the Kenalog should be working.\par \par 4. Trial of Atrovent nasal spray 2 squirts in each nostril 2-3 times per day.\par \par 5. Discontinue Benadryl.\par \par 6. Claritin 10 mg b.i.d.\par \par 7. Robitussin with codeine 10 cc Q4 to 6H p.r.n. for cough suppression. She has been told that she needs to attempt to minimize her cough around-the-clock if possible so that the tracheitis does not persist or worsen.\par \par 8. Continue with Singulair 10 mg daily, and a nebulizer with albuterol only on an as-needed basis.\par \par 9. Followup in one week with pre-post spirometry and O2 saturation. The patient has been told to contact this office immediately if her symptoms persist or worsen.

## 2018-09-07 NOTE — PHYSICAL EXAM
[General Appearance - Alert] : alert [General Appearance - In No Acute Distress] : in no acute distress [Sclera] : the sclera and conjunctiva were normal [PERRL With Normal Accommodation] : pupils were equal in size, round, and reactive to light [Extraocular Movements] : extraocular movements were intact [Oropharynx] : the oropharynx was normal [Neck Appearance] : the appearance of the neck was normal [Neck Cervical Mass (___cm)] : no neck mass was observed [Jugular Venous Distention Increased] : there was no jugular-venous distention [] : no respiratory distress [Auscultation Breath Sounds / Voice Sounds] : lungs were clear to auscultation bilaterally [Heart Rate And Rhythm] : heart rate was normal and rhythm regular [Heart Sounds] : normal S1 and S2 [Heart Sounds Gallop] : no gallops [Heart Sounds Pericardial Friction Rub] : no pericardial rub [FreeTextEntry1] : There is a 1/6 systolic ejection murmur at the left sternal border [Arterial Pulses Carotid] : carotid pulses were normal with no bruits [Edema] : there was no peripheral edema [Cervical Lymph Nodes Enlarged Posterior Bilaterally] : posterior cervical [Cervical Lymph Nodes Enlarged Anterior Bilaterally] : anterior cervical [Supraclavicular Lymph Nodes Enlarged Bilaterally] : supraclavicular [No CVA Tenderness] : no ~M costovertebral angle tenderness [No Spinal Tenderness] : no spinal tenderness [Nail Clubbing] : no clubbing  or cyanosis of the fingernails

## 2018-09-07 NOTE — HISTORY OF PRESENT ILLNESS
[FreeTextEntry8] : The patient comes in today for an emergency evaluation.\par \par The patient is now at approximately 37 weeks of gestation. Over the past month, after being exposed to her  that had a viral upper respiratory tract infection, she has had a persistent cough. She has been treated on multiple occasions with prednisone as high as 60 mg tapering doses. The prednisone was actually lengthened out on 2 occasions. She last finished taking prednisone on 8/22/18. She has also been treated with Pulmicort, Singulair, and Benadryl. She has been intermittently taking Robitussin with codeine. She was also given a course of amoxicillin. She notes that the Benadryl was actually helping to quiet the cough down somewhat throughout the daytime hours.\par \par At this time, she is complaining of a postnasal drip which she states is more significant upon awakening and arising out of bed in the morning. She now says her nose actually "runs like a faucet." She denies any reflux symptoms. She is on proton pump inhibitors. The cough is productive of clear thick white sputum. She denies any overt wheezing, unless she has a coughing paroxysm. She has been followed by her obstetrician, and the baby's health is good. She now comes in for this reevaluation due to the persistent symptomatology.\par \par

## 2018-09-14 ENCOUNTER — NON-APPOINTMENT (OUTPATIENT)
Age: 33
End: 2018-09-14

## 2018-09-14 ENCOUNTER — APPOINTMENT (OUTPATIENT)
Dept: INTERNAL MEDICINE | Facility: CLINIC | Age: 33
End: 2018-09-14
Payer: COMMERCIAL

## 2018-09-14 VITALS
HEIGHT: 63 IN | OXYGEN SATURATION: 97 % | TEMPERATURE: 97.8 F | RESPIRATION RATE: 20 BRPM | SYSTOLIC BLOOD PRESSURE: 108 MMHG | WEIGHT: 250 LBS | DIASTOLIC BLOOD PRESSURE: 74 MMHG | HEART RATE: 86 BPM | BODY MASS INDEX: 44.3 KG/M2

## 2018-09-14 PROCEDURE — 99214 OFFICE O/P EST MOD 30 MIN: CPT | Mod: 25

## 2018-09-14 PROCEDURE — 94060 EVALUATION OF WHEEZING: CPT

## 2018-09-14 NOTE — PLAN
[FreeTextEntry1] : 1. Continue his regimen as outlined above.\par \par 2. The patient will attempt to resume the Atrovent nasal spray but at a lower dose at one squirt in each nostril b.i.d.\par \par 3. As noted, the patient is scheduled for a  on 18 at [facility].\par \par 4. Followup 6 weeks postpartum for a reevaluation with pre-post spirometry.\par \par 5. Follow up with myself in April with full pulmonary function testing.\par \par 6. Routine medical followup with her primary care physician, Dr. Chen.

## 2018-09-14 NOTE — DATA REVIEWED
[FreeTextEntry1] : Spirometric analysis is within normal limits. Slight bronchodilator reactivity is demonstrated.\par \par The oxygen saturation is 97% on room air

## 2018-09-14 NOTE — HISTORY OF PRESENT ILLNESS
[de-identified] : The patient comes in today for a followup evaluation, and reassessment of her pulmonary status.\par \par The patient was seen in the office one week ago, T2 protracted and persistent cough. At that time, she was given a Kenalog injection. She was also placed on prednisone for 6 days. Atrovent nasal spray was started. She was also placed on Claritin, and she discontinued the use of the Benadryl.\par \par The patient noted, that within 2 days, the cough has improved. It still remains fairly good throughout the rest of the week. She notes that she has usually one mild paroxysm per day. She does produce thick sputum only upon awakening in the morning. She feels that the Claritin is helping her more than the Benadryl did. She did, however, developed a bloody nose with the Atrovent nasal spray so she discontinued it. She has not required use of her nebulizer.\par \par The patient denies having any allergy symptoms such as itchy watery eyes. She also states, that her  due date, has been moved up to 18 by her obstetrician. She now comes in for this assessment.

## 2018-09-14 NOTE — PHYSICAL EXAM
[General Appearance - Alert] : alert [General Appearance - In No Acute Distress] : in no acute distress [Sclera] : the sclera and conjunctiva were normal [PERRL With Normal Accommodation] : pupils were equal in size, round, and reactive to light [Extraocular Movements] : extraocular movements were intact [Nasal Cavity] : the nasal mucosa and septum were normal [Oropharynx] : the oropharynx was normal [Neck Appearance] : the appearance of the neck was normal [Neck Cervical Mass (___cm)] : no neck mass was observed [Jugular Venous Distention Increased] : there was no jugular-venous distention [] : no respiratory distress [Auscultation Breath Sounds / Voice Sounds] : lungs were clear to auscultation bilaterally [Heart Rate And Rhythm] : heart rate was normal and rhythm regular [Heart Sounds] : normal S1 and S2 [Heart Sounds Gallop] : no gallops [Heart Sounds Pericardial Friction Rub] : no pericardial rub [FreeTextEntry1] : There is a 1/6 systolic ejection murmur at the left sternal border [Arterial Pulses Carotid] : carotid pulses were normal with no bruits [Edema] : there was no peripheral edema [Cervical Lymph Nodes Enlarged Posterior Bilaterally] : posterior cervical [Cervical Lymph Nodes Enlarged Anterior Bilaterally] : anterior cervical [Supraclavicular Lymph Nodes Enlarged Bilaterally] : supraclavicular [No CVA Tenderness] : no ~M costovertebral angle tenderness [No Spinal Tenderness] : no spinal tenderness [Nail Clubbing] : no clubbing  or cyanosis of the fingernails

## 2018-10-23 ENCOUNTER — RESULT CHARGE (OUTPATIENT)
Age: 33
End: 2018-10-23

## 2018-10-24 ENCOUNTER — NON-APPOINTMENT (OUTPATIENT)
Age: 33
End: 2018-10-24

## 2018-10-24 ENCOUNTER — MED ADMIN CHARGE (OUTPATIENT)
Age: 33
End: 2018-10-24

## 2018-10-24 ENCOUNTER — APPOINTMENT (OUTPATIENT)
Dept: INTERNAL MEDICINE | Facility: CLINIC | Age: 33
End: 2018-10-24
Payer: COMMERCIAL

## 2018-10-24 VITALS
RESPIRATION RATE: 16 BRPM | HEART RATE: 72 BPM | OXYGEN SATURATION: 97 % | TEMPERATURE: 97.9 F | WEIGHT: 230 LBS | SYSTOLIC BLOOD PRESSURE: 102 MMHG | HEIGHT: 63 IN | BODY MASS INDEX: 40.75 KG/M2 | DIASTOLIC BLOOD PRESSURE: 70 MMHG

## 2018-10-24 PROCEDURE — 90686 IIV4 VACC NO PRSV 0.5 ML IM: CPT | Mod: GA

## 2018-10-24 PROCEDURE — 99214 OFFICE O/P EST MOD 30 MIN: CPT | Mod: 25

## 2018-10-24 PROCEDURE — G0008: CPT

## 2018-10-24 PROCEDURE — 94060 EVALUATION OF WHEEZING: CPT

## 2018-10-24 RX ORDER — DIPHENHYDRAMINE HCL 2 %
25 CREAM (GRAM) TOPICAL
Refills: 0 | Status: DISCONTINUED | COMMUNITY
End: 2018-10-24

## 2018-10-24 RX ORDER — LANSOPRAZOLE 30 MG/1
30 CAPSULE, DELAYED RELEASE ORAL
Refills: 0 | Status: DISCONTINUED | COMMUNITY
Start: 2018-08-10 | End: 2018-10-24

## 2018-10-24 RX ORDER — IPRATROPIUM BROMIDE 42 UG/1
0.06 SPRAY NASAL 3 TIMES DAILY
Qty: 1 | Refills: 11 | Status: DISCONTINUED | COMMUNITY
Start: 2018-09-07 | End: 2018-10-24

## 2018-10-24 RX ORDER — PREDNISONE 20 MG/1
20 TABLET ORAL
Qty: 9 | Refills: 0 | Status: DISCONTINUED | COMMUNITY
Start: 2018-09-07 | End: 2018-10-24

## 2018-10-24 NOTE — ASSESSMENT
[FreeTextEntry1] : \par \par Pt will remain on Pulmicort 2 inh bid as she is breast feeding.  After she discontinues this will resume Advair.  \par \par Continue Singulair and Claritan.  \par \par FU 6 months and prn.

## 2018-10-24 NOTE — REVIEW OF SYSTEMS
[Fever] : no fever [Chills] : no chills [Nasal Discharge] : no nasal discharge [Sore Throat] : no sore throat [Postnasal Drip] : no postnasal drip [Negative] : Genitourinary

## 2018-10-24 NOTE — PHYSICAL EXAM
[General Appearance - Alert] : alert [General Appearance - In No Acute Distress] : in no acute distress [Nasal Cavity] : the nasal mucosa and septum were normal [Oropharynx] : the oropharynx was normal [Neck Appearance] : the appearance of the neck was normal [Neck Cervical Mass (___cm)] : no neck mass was observed [Jugular Venous Distention Increased] : there was no jugular-venous distention [] : no respiratory distress [Auscultation Breath Sounds / Voice Sounds] : lungs were clear to auscultation bilaterally [Heart Rate And Rhythm] : heart rate was normal and rhythm regular [Heart Sounds] : normal S1 and S2 [Heart Sounds Gallop] : no gallops [Heart Sounds Pericardial Friction Rub] : no pericardial rub [Arterial Pulses Carotid] : carotid pulses were normal with no bruits [Edema] : there was no peripheral edema [Cervical Lymph Nodes Enlarged Posterior Bilaterally] : posterior cervical [Cervical Lymph Nodes Enlarged Anterior Bilaterally] : anterior cervical [Supraclavicular Lymph Nodes Enlarged Bilaterally] : supraclavicular [FreeTextEntry1] : There is a 1/6 systolic ejection murmur at the left sternal border

## 2018-10-24 NOTE — HISTORY OF PRESENT ILLNESS
[FreeTextEntry1] : FU asthma [de-identified] : Had  18 and delivered healthy baby girl.  She  had experienced  SOB after delivery which was treated with short stress of prednisone. After several days her cough completely resolved.  She did go to an UC one week ago and is being treated for strep and is on Amoxicillin.Sore throat gone.  She had a slight dry cough which has resolved. There is no wheeze or SOB.  She remains on Pulmicort, singulair and claritan but has discontinued atrovent, Cheratussin and is not requiring use of rescue inhaler.

## 2018-11-12 ENCOUNTER — RX RENEWAL (OUTPATIENT)
Age: 33
End: 2018-11-12

## 2018-11-12 ENCOUNTER — APPOINTMENT (OUTPATIENT)
Dept: OTOLARYNGOLOGY | Facility: CLINIC | Age: 33
End: 2018-11-12

## 2019-02-07 ENCOUNTER — RX RENEWAL (OUTPATIENT)
Age: 34
End: 2019-02-07

## 2019-02-14 ENCOUNTER — RX RENEWAL (OUTPATIENT)
Age: 34
End: 2019-02-14

## 2019-04-01 ENCOUNTER — APPOINTMENT (OUTPATIENT)
Dept: INTERNAL MEDICINE | Facility: CLINIC | Age: 34
End: 2019-04-01
Payer: COMMERCIAL

## 2019-04-01 VITALS
HEART RATE: 78 BPM | WEIGHT: 245 LBS | TEMPERATURE: 98.2 F | DIASTOLIC BLOOD PRESSURE: 64 MMHG | OXYGEN SATURATION: 97 % | RESPIRATION RATE: 16 BRPM | HEIGHT: 63 IN | BODY MASS INDEX: 43.41 KG/M2 | SYSTOLIC BLOOD PRESSURE: 104 MMHG

## 2019-04-01 LAB — CYTOLOGY CVX/VAG DOC THIN PREP: NORMAL

## 2019-04-01 PROCEDURE — 99215 OFFICE O/P EST HI 40 MIN: CPT | Mod: 25

## 2019-04-01 PROCEDURE — 94060 EVALUATION OF WHEEZING: CPT

## 2019-04-01 PROCEDURE — 94729 DIFFUSING CAPACITY: CPT

## 2019-04-01 PROCEDURE — 94727 GAS DIL/WSHOT DETER LNG VOL: CPT

## 2019-04-01 RX ORDER — MOMETASONE FUROATE 200 UG/1
200 AEROSOL RESPIRATORY (INHALATION) TWICE DAILY
Qty: 1 | Refills: 2 | Status: DISCONTINUED | COMMUNITY
Start: 2019-02-07 | End: 2019-04-01

## 2019-04-01 RX ORDER — BUDESONIDE 180 UG/1
180 AEROSOL, POWDER RESPIRATORY (INHALATION) TWICE DAILY
Qty: 3 | Refills: 1 | Status: DISCONTINUED | COMMUNITY
Start: 2018-07-13 | End: 2019-04-01

## 2019-04-01 RX ORDER — MONTELUKAST 10 MG/1
10 TABLET, FILM COATED ORAL
Qty: 30 | Refills: 5 | Status: DISCONTINUED | COMMUNITY
End: 2019-04-01

## 2019-04-01 NOTE — PLAN
[FreeTextEntry1] : 1. The patient will now continue with a maintenance regimen of Singulair 10 mg daily, and Claritin 10 mg daily.\par \par 2. The patient will resume an inhaled corticosteroid, beginning September 1, to preemptively treat her prior to the onset of the winter months which are usually the worst time of year for her. To this end, she will begin Pulmicort 2 puffs once or twice a day if she is breast-feeding. She will institute Advair 250/50 one puff b.i.d. if she is not breast-feeding.\par \par 3. Followup in 6 months with pre-post spirometry and O2 saturation.\par \par 4. Routine medical followup with her primary care physician, Dr. Chen.\par \par 5. Diet weight loss and exercise have been stressed.

## 2019-04-01 NOTE — HISTORY OF PRESENT ILLNESS
[de-identified] : Patient comes in today for a followup evaluation, and a yearly reassessment of her pulmonary status.\par \par From a pulmonary standpoint, the patient states that she is doing well. She has been maintained primarily on Singulair on a daily basis and Claritin. She discontinued her Pulmicort on her own back in January. She felt as if the medication was actually causing her cough. She did not have any exacerbations of her underlying asthma. 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 She denies any overt breathlessness. She does occasionally walk. She has not required the use of her rescue inhaler. In the past, we have always attempted to reinstitute her inhaled corticosteroids at the beginning of the fall because the worst part of the year for her is usually in the winter months. She denies any acute symptoms at this time. She now comes in for this assessment

## 2019-04-01 NOTE — PHYSICAL EXAM
[No Rash] : no rash [Normal Gait] : normal gait [Coordination Grossly Intact] : coordination grossly intact [Normal Affect] : the affect was normal [Normal Insight/Judgement] : insight and judgment were intact [General Appearance - Alert] : alert [General Appearance - In No Acute Distress] : in no acute distress [Sclera] : the sclera and conjunctiva were normal [PERRL With Normal Accommodation] : pupils were equal in size, round, and reactive to light [Extraocular Movements] : extraocular movements were intact [Neck Appearance] : the appearance of the neck was normal [Neck Cervical Mass (___cm)] : no neck mass was observed [Jugular Venous Distention Increased] : there was no jugular-venous distention [] : no respiratory distress [Auscultation Breath Sounds / Voice Sounds] : lungs were clear to auscultation bilaterally [Heart Rate And Rhythm] : heart rate was normal and rhythm regular [Heart Sounds] : normal S1 and S2 [Heart Sounds Gallop] : no gallops [Heart Sounds Pericardial Friction Rub] : no pericardial rub [FreeTextEntry1] : There is a 1/6 systolic ejection murmur at the left sternal border [Arterial Pulses Carotid] : carotid pulses were normal with no bruits [Edema] : there was no peripheral edema [Cervical Lymph Nodes Enlarged Posterior Bilaterally] : posterior cervical [Cervical Lymph Nodes Enlarged Anterior Bilaterally] : anterior cervical [Supraclavicular Lymph Nodes Enlarged Bilaterally] : supraclavicular [No CVA Tenderness] : no ~M costovertebral angle tenderness [No Spinal Tenderness] : no spinal tenderness [Nail Clubbing] : no clubbing  or cyanosis of the fingernails

## 2019-04-01 NOTE — DATA REVIEWED
[FreeTextEntry1] : A pulmonary function test is performed. Lung volumes are within normal limits except for a mild decrease in residual volume and a significant decrease in the ERCP. Lung mechanics are within normal limits. Slight bronchodilator activity is demonstrated. The DLCO is mildly reduced. Saturation is maintained. This represents a mild degree restriction, most likely due to her centripetal obesity. The decrease in DLCO is most likely due to VQ mismatch due to her obesity. The flow rates are normal with minimal airway reactivity at present there

## 2019-07-30 ENCOUNTER — RX RENEWAL (OUTPATIENT)
Age: 34
End: 2019-07-30

## 2019-10-18 ENCOUNTER — APPOINTMENT (OUTPATIENT)
Dept: INTERNAL MEDICINE | Facility: CLINIC | Age: 34
End: 2019-10-18

## 2019-11-27 ENCOUNTER — MEDICATION RENEWAL (OUTPATIENT)
Age: 34
End: 2019-11-27

## 2019-12-02 ENCOUNTER — RX RENEWAL (OUTPATIENT)
Age: 34
End: 2019-12-02

## 2019-12-06 ENCOUNTER — APPOINTMENT (OUTPATIENT)
Dept: INTERNAL MEDICINE | Facility: CLINIC | Age: 34
End: 2019-12-06
Payer: COMMERCIAL

## 2019-12-06 ENCOUNTER — NON-APPOINTMENT (OUTPATIENT)
Age: 34
End: 2019-12-06

## 2019-12-06 VITALS
DIASTOLIC BLOOD PRESSURE: 80 MMHG | TEMPERATURE: 98.3 F | HEIGHT: 63 IN | HEART RATE: 76 BPM | BODY MASS INDEX: 43.59 KG/M2 | RESPIRATION RATE: 16 BRPM | SYSTOLIC BLOOD PRESSURE: 114 MMHG | OXYGEN SATURATION: 98 % | WEIGHT: 246 LBS

## 2019-12-06 PROCEDURE — 99214 OFFICE O/P EST MOD 30 MIN: CPT | Mod: 25

## 2019-12-06 PROCEDURE — 94060 EVALUATION OF WHEEZING: CPT

## 2019-12-06 RX ORDER — MONTELUKAST 10 MG/1
10 TABLET, FILM COATED ORAL DAILY
Qty: 1 | Refills: 5 | Status: DISCONTINUED | COMMUNITY
Start: 2019-04-01 | End: 2019-12-06

## 2019-12-06 RX ORDER — FLUTICASONE PROPIONATE AND SALMETEROL 250; 50 UG/1; UG/1
250-50 POWDER RESPIRATORY (INHALATION)
Qty: 1 | Refills: 3 | Status: DISCONTINUED | COMMUNITY
Start: 2019-11-27 | End: 2019-12-06

## 2019-12-06 RX ORDER — BUDESONIDE 180 UG/1
180 AEROSOL, POWDER RESPIRATORY (INHALATION)
Qty: 1 | Refills: 3 | Status: DISCONTINUED | COMMUNITY
Start: 2019-11-27 | End: 2019-12-06

## 2019-12-06 RX ORDER — PROPRANOLOL HYDROCHLORIDE 20 MG/1
20 TABLET ORAL
Qty: 30 | Refills: 0 | Status: DISCONTINUED | COMMUNITY
Start: 2018-04-16 | End: 2019-12-06

## 2019-12-06 NOTE — HISTORY OF PRESENT ILLNESS
[de-identified] : This patient comes in today for a follow up evaluation and reassessment of her pulmonary status.\par \par Overall, from a pulmonary standpoint, she states that she is stable. The patient suffers from moderate persistent asthma. She remains compliant with her singulair and advair for management. Her rescue inhaler recently , however, she notes that she usually only uses it when she gets sick. She denies mucous, and phlegm production.\par \par She notes that she developed an intermittent, non-productive cough, inspiratory wheeze, and postnasal drip approximately 3 weeks ago. She denies congestion. She has not been evaluated for treatment, however, she has used robitussin for management. There have been no fevers or rigors. She denies any sick contacts. She denies chills, night sweats, and any other constitutional symptoms.\par \par Additionally, she does not follow a formal exercise regimen. She denies CP, tightness, palpitations, and dyspnea on exertion.\par \par  She now comes in for this assessment.

## 2019-12-06 NOTE — ADDENDUM
[FreeTextEntry1] : I, Herve Sanders, acted solely as a scribe for Dr. Roque Smith on this date 12/06/2019.

## 2019-12-06 NOTE — END OF VISIT
[FreeTextEntry3] : All medical record entries made by the scribe were at my, Dr. Roque Smith, direction and personally dictated by me on 12/06/2019. I have reviewed the chart and agree that the record accurately reflects my personal performance of the history, physical exam, assessment and plan. I have also personally directed, reviewed, and agreed with the chart.

## 2019-12-06 NOTE — PHYSICAL EXAM
[Sclera] : the sclera and conjunctiva were normal [PERRL With Normal Accommodation] : pupils were equal in size, round, and reactive to light [General Appearance - In No Acute Distress] : in no acute distress [General Appearance - Alert] : alert [Oropharynx] : the oropharynx was normal [Extraocular Movements] : extraocular movements were intact [Nasal Cavity] : the nasal mucosa and septum were normal [Neck Appearance] : the appearance of the neck was normal [] : no respiratory distress [Neck Cervical Mass (___cm)] : no neck mass was observed [Jugular Venous Distention Increased] : there was no jugular-venous distention [Heart Sounds] : normal S1 and S2 [Heart Rate And Rhythm] : heart rate was normal and rhythm regular [Auscultation Breath Sounds / Voice Sounds] : lungs were clear to auscultation bilaterally [Heart Sounds Gallop] : no gallops [Heart Sounds Pericardial Friction Rub] : no pericardial rub [Cervical Lymph Nodes Enlarged Posterior Bilaterally] : posterior cervical [Arterial Pulses Carotid] : carotid pulses were normal with no bruits [Edema] : there was no peripheral edema [Cervical Lymph Nodes Enlarged Anterior Bilaterally] : anterior cervical [No CVA Tenderness] : no ~M costovertebral angle tenderness [Supraclavicular Lymph Nodes Enlarged Bilaterally] : supraclavicular [Nail Clubbing] : no clubbing  or cyanosis of the fingernails [No Spinal Tenderness] : no spinal tenderness [FreeTextEntry1] : There is a 1/6 systolic ejection murmur at the left sternal border

## 2019-12-06 NOTE — PLAN
[FreeTextEntry1] : 1. Continue with medication outlined above.\par \par 2. A cardiovascular exercise regimen was recommended.\par \par 3. Follow up [facility] PCP, Dr. Chen, for routine evaluation.\par \par 4. Follow up with myself in 6 months for a PFT.\par \par 5. If her cough doesn't resolve in the next week, we will consider institution of prednisone, 40 mg, qd for 6 days or a medrol dosepak.

## 2019-12-19 ENCOUNTER — MEDICATION RENEWAL (OUTPATIENT)
Age: 34
End: 2019-12-19

## 2019-12-19 ENCOUNTER — APPOINTMENT (OUTPATIENT)
Dept: INTERNAL MEDICINE | Facility: CLINIC | Age: 34
End: 2019-12-19
Payer: COMMERCIAL

## 2019-12-19 ENCOUNTER — NON-APPOINTMENT (OUTPATIENT)
Age: 34
End: 2019-12-19

## 2019-12-19 VITALS
OXYGEN SATURATION: 97 % | TEMPERATURE: 97.7 F | DIASTOLIC BLOOD PRESSURE: 70 MMHG | BODY MASS INDEX: 43.59 KG/M2 | RESPIRATION RATE: 16 BRPM | HEART RATE: 88 BPM | SYSTOLIC BLOOD PRESSURE: 124 MMHG | WEIGHT: 246 LBS | HEIGHT: 63 IN

## 2019-12-19 PROCEDURE — 94060 EVALUATION OF WHEEZING: CPT

## 2019-12-19 PROCEDURE — 99214 OFFICE O/P EST MOD 30 MIN: CPT | Mod: 25

## 2019-12-19 NOTE — HISTORY OF PRESENT ILLNESS
[FreeTextEntry8] : Asthma/cough\par \par Developed cough 4 weeks ago.  Visit here 12/6.  Dr. Smith suggested she wait but symptoms worsened and on 12/12  she started prednisone  80 mg x 6 days with minimal improvement.  Now has PND along with cough.  Sputum is thick and white.   She is not using any nasal washes or sprays.   Using her rescue inhaler 2-4 times daily.  Taking tums for Gerd.

## 2019-12-19 NOTE — PHYSICAL EXAM
[Normal Outer Ear/Nose] : the outer ears and nose were normal in appearance [Normal TMs] : both tympanic membranes were normal [No Respiratory Distress] : no respiratory distress  [No Accessory Muscle Use] : no accessory muscle use [No Edema] : there was no peripheral edema [No Extremity Clubbing/Cyanosis] : no extremity clubbing/cyanosis [Normal] : affect was normal and insight and judgment were intact [Soft] : abdomen soft [de-identified] : +oral candidiasis [de-identified] : obese

## 2019-12-19 NOTE — ASSESSMENT
[FreeTextEntry1] : Prednisone 60 mg x 3 days, 40 x 3 days, 20 x 4 days, 10 mg x 5 days, 5 mg x 7 days.  \par OMeprazole 20 qd\par Astelin bid\par Tessalon for daytime with Gibran Ac at HS.\par Mycelex for Thrush.\par Call if symptoms not improving.  \par Case reviewed with

## 2020-01-06 ENCOUNTER — EMERGENCY (EMERGENCY)
Facility: HOSPITAL | Age: 35
LOS: 1 days | Discharge: HOME | End: 2020-01-06
Attending: EMERGENCY MEDICINE
Payer: COMMERCIAL

## 2020-01-06 VITALS
TEMPERATURE: 98 F | SYSTOLIC BLOOD PRESSURE: 109 MMHG | RESPIRATION RATE: 17 BRPM | DIASTOLIC BLOOD PRESSURE: 73 MMHG | HEART RATE: 79 BPM | OXYGEN SATURATION: 96 %

## 2020-01-06 VITALS — HEIGHT: 63 IN | WEIGHT: 248.02 LBS

## 2020-01-06 DIAGNOSIS — J45.909 UNSPECIFIED ASTHMA, UNCOMPLICATED: ICD-10-CM

## 2020-01-06 DIAGNOSIS — G93.5 COMPRESSION OF BRAIN: ICD-10-CM

## 2020-01-06 DIAGNOSIS — G43.909 MIGRAINE, UNSPECIFIED, NOT INTRACTABLE, WITHOUT STATUS MIGRAINOSUS: ICD-10-CM

## 2020-01-06 DIAGNOSIS — R11.2 NAUSEA WITH VOMITING, UNSPECIFIED: ICD-10-CM

## 2020-01-06 DIAGNOSIS — E28.2 POLYCYSTIC OVARIAN SYNDROME: ICD-10-CM

## 2020-01-06 PROCEDURE — 99284 EMERGENCY DEPT VISIT MOD MDM: CPT

## 2020-01-06 PROCEDURE — 96374 THER/PROPH/DIAG INJ IV PUSH: CPT

## 2020-01-06 PROCEDURE — 99284 EMERGENCY DEPT VISIT MOD MDM: CPT | Mod: 25

## 2020-01-06 PROCEDURE — 96375 TX/PRO/DX INJ NEW DRUG ADDON: CPT

## 2020-01-06 PROCEDURE — 81025 URINE PREGNANCY TEST: CPT

## 2020-01-06 RX ORDER — METOCLOPRAMIDE HCL 10 MG
10 TABLET ORAL ONCE
Refills: 0 | Status: DISCONTINUED | OUTPATIENT
Start: 2020-01-06 | End: 2020-01-06

## 2020-01-06 RX ORDER — KETOROLAC TROMETHAMINE 30 MG/ML
30 SYRINGE (ML) INJECTION ONCE
Refills: 0 | Status: DISCONTINUED | OUTPATIENT
Start: 2020-01-06 | End: 2020-01-06

## 2020-01-06 RX ORDER — ONDANSETRON 8 MG/1
1 TABLET, FILM COATED ORAL
Qty: 9 | Refills: 0
Start: 2020-01-06 | End: 2020-01-08

## 2020-01-06 RX ORDER — SODIUM CHLORIDE 9 MG/ML
1000 INJECTION INTRAMUSCULAR; INTRAVENOUS; SUBCUTANEOUS ONCE
Refills: 0 | Status: COMPLETED | OUTPATIENT
Start: 2020-01-06 | End: 2020-01-06

## 2020-01-06 RX ORDER — METOCLOPRAMIDE HCL 10 MG
10 TABLET ORAL ONCE
Refills: 0 | Status: COMPLETED | OUTPATIENT
Start: 2020-01-06 | End: 2020-01-06

## 2020-01-06 RX ORDER — DIPHENHYDRAMINE HCL 50 MG
50 CAPSULE ORAL ONCE
Refills: 0 | Status: COMPLETED | OUTPATIENT
Start: 2020-01-06 | End: 2020-01-06

## 2020-01-06 RX ADMIN — Medication 50 MILLIGRAM(S): at 13:57

## 2020-01-06 RX ADMIN — Medication 10 MILLIGRAM(S): at 13:58

## 2020-01-06 RX ADMIN — SODIUM CHLORIDE 1000 MILLILITER(S): 9 INJECTION INTRAMUSCULAR; INTRAVENOUS; SUBCUTANEOUS at 13:58

## 2020-01-06 RX ADMIN — Medication 30 MILLIGRAM(S): at 15:09

## 2020-01-06 NOTE — ED ADULT TRIAGE NOTE - CHIEF COMPLAINT QUOTE
Pt reports hx of migraine headaches with last 5 days of intractable headache, with usual treatments not working. Patient has c/o extreme pain and pressure in head and is unable to care for her 15 month old at home +N/V

## 2020-01-06 NOTE — ED STATDOCS - PATIENT PORTAL LINK FT
You can access the FollowMyHealth Patient Portal offered by [facility] by registering at the following website: [email or web address]. By joining [employer]’s FollowMyHealth portal, you will also be able to view your health information using other applications (apps) compatible with our system.

## 2020-01-06 NOTE — ED STATDOCS - ATTENDING CONTRIBUTION TO CARE
I provided initial assessment of patient and had discussion of plan with ACP and was available for further consultation throughout ed stay.

## 2020-01-06 NOTE — ED STATDOCS - OBJECTIVE STATEMENT
35 y/o female with a PMHx of Asthma, Chiari malformation type I, Migraines normally to left frontal head, presents to the ED c/o severe "diffuse" constant migraines that wake her up in the morning x 5 days. +new onset nausea and vomiting. States head movement, bright light and loud sounds aggravates headache. Denies numbness, weakness, dizziness. Recently finished steroids. Takes Migraine medication, with no relief. Took Aleve this morning as well, with no relief.

## 2020-01-06 NOTE — ED STATDOCS - CLINICAL SUMMARY MEDICAL DECISION MAKING FREE TEXT BOX
pt with likely migraine, home medication, will give migraine cocktail, pain max 9 out of 10 no focal neuro deficits. no red flags of headache, give med and reassess. pt with likely migraine, home medication, will give migraine cocktail, pain max 9 out of 10 no focal neuro deficits. no red flags of headache, give med and reassess.    Pt. feeling better, Will send Rx for Zofran to pharmacy and patient instructed to return for any worsening symptoms.   To follow with neurology.  Crystal Guillen PA-C Pt with history of chronic migraines.  +photophobia.  +phonophobia.  No focal deficits.  Not worst HA of life.  9/10 intensity at maximum.  No f/c.  No travel.  No meningeal signs.  Benign neuro exam here.  Improved with HA cocktail.  No red flags for dangerous etiology of HA at  this time.  Discussed  red flag features and discussed return precautions.  Discussed possible CTH imaging to eval for mass, although much less likely given acuteness of symptoms.  Pt in agreement that  she would like to avoid imaging.  Understands risks.  I believe avoiding imaging and providing strict return precautions is  appropriate a t this time.  D/c home with strict return precautions and prompt outpatient f/u.      Pt. feeling better, Will send Rx for Zofran to pharmacy and patient instructed to return for any worsening symptoms.   To follow with neurology.  Crystal Giullen PA-C

## 2020-01-06 NOTE — ED STATDOCS - PROGRESS NOTE DETAILS
35 y/o female with a PMHx of Asthma, Chiari malformation type I, Migraines normally to left frontal head, presents to the ED c/o severe "diffuse" constant migraines that wake her up in the morning x 5 days. +new onset nausea and vomiting. States head movement, bright light and loud sounds aggravates headache. Denies numbness, weakness, dizziness. Recently finished steroids. Takes Migraine medication, with no relief. Took Aleve this morning as well, with no relief.   Crystal Guillen PA-C Migraine HA, worsening over five days not relieved at home with meds.  Will give Reglan, Benadryl, IVF, reassess.  Crystal Guillen PA-C Pain moderately improved after medications.  IVF infusing.  Pt. states feeling "lightheaded" after walking to bathroom.  Will continue to monitor.  Crystal Guillen PA-C Pt. feeling better, Will send Rx for Zofran to pharmacy and patient instructed to return for any worsening symptoms.   To follow with neurology.  Crystal Guillen PA-C

## 2020-01-06 NOTE — ED STATDOCS - CARE PROVIDER_API CALL
Paige Quijano (MD)  Clinical Neurophysiology; EEGEpilepsy; Neurology; Sleep Medicine  [address] [facility] [address] [facility], [place] [address]  Phone: [phone]  Fax: [phone]  Follow Up Time:

## 2020-02-28 ENCOUNTER — APPOINTMENT (OUTPATIENT)
Dept: INTERNAL MEDICINE | Facility: CLINIC | Age: 35
End: 2020-02-28
Payer: COMMERCIAL

## 2020-02-28 ENCOUNTER — NON-APPOINTMENT (OUTPATIENT)
Age: 35
End: 2020-02-28

## 2020-02-28 VITALS
TEMPERATURE: 98 F | BODY MASS INDEX: 43.41 KG/M2 | SYSTOLIC BLOOD PRESSURE: 116 MMHG | OXYGEN SATURATION: 98 % | HEART RATE: 80 BPM | RESPIRATION RATE: 18 BRPM | DIASTOLIC BLOOD PRESSURE: 78 MMHG | HEIGHT: 63 IN | WEIGHT: 245 LBS

## 2020-02-28 PROCEDURE — 99214 OFFICE O/P EST MOD 30 MIN: CPT | Mod: 25

## 2020-02-28 PROCEDURE — 94010 BREATHING CAPACITY TEST: CPT

## 2020-02-28 RX ORDER — ALBUTEROL SULFATE 2.5 MG/3ML
(2.5 MG/3ML) SOLUTION RESPIRATORY (INHALATION)
Qty: 1 | Refills: 2 | Status: DISCONTINUED | COMMUNITY
Start: 2018-08-06 | End: 2020-02-28

## 2020-02-28 RX ORDER — AZELASTINE HYDROCHLORIDE 205.5 UG/1
0.15 SPRAY, METERED NASAL
Qty: 1 | Refills: 5 | Status: DISCONTINUED | COMMUNITY
Start: 2019-12-19 | End: 2020-02-28

## 2020-02-28 RX ORDER — PREDNISONE 20 MG/1
20 TABLET ORAL
Qty: 30 | Refills: 0 | Status: DISCONTINUED | COMMUNITY
Start: 2019-12-19 | End: 2020-02-28

## 2020-02-28 RX ORDER — BENZONATATE 100 MG/1
100 CAPSULE ORAL
Refills: 0 | Status: DISCONTINUED | COMMUNITY
End: 2020-02-28

## 2020-02-28 RX ORDER — BACILLUS COAGULANS/INULIN 1B-250 MG
CAPSULE ORAL
Refills: 0 | Status: DISCONTINUED | COMMUNITY
End: 2020-02-28

## 2020-02-28 RX ORDER — ELASTIC BANDAGE 2"X2.2YD
BANDAGE TOPICAL
Refills: 0 | Status: DISCONTINUED | COMMUNITY
End: 2020-02-28

## 2020-02-28 NOTE — PHYSICAL EXAM
[No Acute Distress] : no acute distress [Well Developed] : well developed [Well Nourished] : well nourished [Normal Sclera/Conjunctiva] : normal sclera/conjunctiva [PERRL] : pupils equal round and reactive to light [Normal Oropharynx] : the oropharynx was normal [Normal Outer Ear/Nose] : the outer ears and nose were normal in appearance [No JVD] : no jugular venous distention [No Lymphadenopathy] : no lymphadenopathy [No Respiratory Distress] : no respiratory distress  [Supple] : supple [Normal Rate] : normal rate  [Clear to Auscultation] : lungs were clear to auscultation bilaterally [No Accessory Muscle Use] : no accessory muscle use [Regular Rhythm] : with a regular rhythm [Normal S1, S2] : normal S1 and S2 [No Edema] : there was no peripheral edema [No Extremity Clubbing/Cyanosis] : no extremity clubbing/cyanosis [Soft] : abdomen soft [Non Tender] : non-tender [Non-distended] : non-distended [No HSM] : no HSM [Normal Bowel Sounds] : normal bowel sounds [Normal Anterior Cervical Nodes] : no anterior cervical lymphadenopathy [No Rash] : no rash [No Focal Deficits] : no focal deficits [Deep Tendon Reflexes (DTR)] : deep tendon reflexes were 2+ and symmetric [Normal Affect] : the affect was normal [Normal Insight/Judgement] : insight and judgment were intact

## 2020-02-28 NOTE — HISTORY OF PRESENT ILLNESS
[FreeTextEntry1] : RV [de-identified] : This is a 34-year-old female with a history of moderate persistent asthma, previous episodes of tracheitis, who developed a cold about 2 weeks ago.  her daughter was also ill at that time. She has noted postnasal drip. She also has noted coughing fits some of which time she also notes associated wheezing. She is not having sputum production or noting dyspnea on exertion. She is maintained on Advair 230 strength and montelukast. Has has been using her rescue Provera inhaler about 2-3 times per day recently. He denies recent fever or chills.

## 2020-02-28 NOTE — ASSESSMENT
[FreeTextEntry1] : #1 recent URI with significant cough, postnasal drip and tracheitis. Patient will continue Claritin and add azelastine 2 sprays per nostril b.i.d. Nasal saline spray p.r.n. Steam p.r.n.  Prednisone taper 60, 60, 60, 40, 40, 40, 20, 20, 10, 10.  Benzonatate 100 mg p.o. q.8 hours p.r.n.\par \par #2 moderate persistent asthma.  Continue Advair montelukast. \par \par #3 prescribed clotrimazole troches if develops oral Candida.

## 2020-02-28 NOTE — REVIEW OF SYSTEMS
[Fever] : no fever [Chills] : no chills [Postnasal Drip] : postnasal drip [Wheezing] : wheezing [Cough] : cough [Dyspnea on Exertion] : no dyspnea on exertion [Negative] : Heme/Lymph [FreeTextEntry6] : see above

## 2020-03-09 RX ORDER — AZELASTINE HYDROCHLORIDE 205.5 UG/1
0.15 SPRAY, METERED NASAL
Qty: 90 | Refills: 3 | Status: DISCONTINUED | COMMUNITY
Start: 2020-02-28 | End: 2020-03-09

## 2020-04-03 ENCOUNTER — APPOINTMENT (OUTPATIENT)
Dept: INTERNAL MEDICINE | Facility: CLINIC | Age: 35
End: 2020-04-03

## 2020-04-22 ENCOUNTER — APPOINTMENT (OUTPATIENT)
Dept: INTERNAL MEDICINE | Facility: CLINIC | Age: 35
End: 2020-04-22
Payer: COMMERCIAL

## 2020-04-22 PROCEDURE — 99214 OFFICE O/P EST MOD 30 MIN: CPT | Mod: 95

## 2020-04-22 RX ORDER — BENZONATATE 100 MG/1
100 CAPSULE ORAL 3 TIMES DAILY
Qty: 30 | Refills: 0 | Status: DISCONTINUED | COMMUNITY
Start: 2020-02-28 | End: 2020-04-22

## 2020-04-22 RX ORDER — CLOTRIMAZOLE 10 MG/1
10 LOZENGE ORAL DAILY
Qty: 35 | Refills: 0 | Status: DISCONTINUED | COMMUNITY
Start: 2019-12-19 | End: 2020-04-22

## 2020-04-22 RX ORDER — PREDNISONE 20 MG/1
20 TABLET ORAL DAILY
Qty: 21 | Refills: 0 | Status: DISCONTINUED | COMMUNITY
Start: 2020-02-28 | End: 2020-04-22

## 2020-04-22 RX ORDER — CLOTRIMAZOLE 10 MG/1
10 LOZENGE ORAL
Qty: 70 | Refills: 0 | Status: DISCONTINUED | COMMUNITY
Start: 2020-02-28 | End: 2020-04-22

## 2020-04-22 NOTE — HISTORY OF PRESENT ILLNESS
[Home] : at home, [unfilled] , at the time of the visit. [Medical Office: ([facility])___] : at the medical office located in  [Patient] : the patient [de-identified] : Spoke with the patient today.\par \par The patient states, that she noted the onset of increased cough and symptomatology at the end of February. At that time, she was evaluated in the office by Dr. Sotelo, who placed her on a course of prednisone. She did improve significantly, but she never completely cleared with regards to a residual cough.\par \par The patient called on 4/3/10. At that time, she felt as if she was having a slight flare of her asthma. 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 She remained on her same medications. The following week, on 4/10, she noted that she was using her rescue inhaler more frequently. This is a bit unusual for her, because she is usually improved between March and August. She notes that she was using her rescue inhaler twice a day. The cough is primarily dry. She has been compliant with the Astra Pro, Singulair, and Advair. She did note slight increase in shortness of breath with walking. She developed slight chest tightness.\par \par The patient denies having any nocturnal symptoms such as cough. She does have significant postnasal drip which he feels is getting to the cough. The drip appears to be worse at night. The secretions from her nose are minimal and when they do come out, they are clear. She has had a great response to Benadryl, but it does cause fatigue. She does take Claritin 24 on a regular basis. She also notes, that in the past, she had been on Dymista, which really helped her postnasal drip. [Self] : self

## 2020-04-22 NOTE — PHYSICAL EXAM
[Well Nourished] : well nourished [No Acute Distress] : no acute distress [Well Developed] : well developed [Well-Appearing] : well-appearing [Coordination Grossly Intact] : coordination grossly intact [No Respiratory Distress] : no respiratory distress  [Normal Gait] : normal gait [Normal Insight/Judgement] : insight and judgment were intact [Normal Affect] : the affect was normal

## 2020-04-22 NOTE — PLAN
[FreeTextEntry1] : 1. Continued medication as outlined above.\par \par 2. Will now institute Nasacort 2 squirts in each nostril b.i.d. Of note is the fact, that the patient was treated with Dymista in the past, which is a combination of both the azelastine, and a nasal corticosteroid. She will therefore continue her as a last seen and essentially have the same combination of medications.\par \par 3. The patient will now switch over to regular Claritin to take one tablet in the morning. She will discontinue the Claritin 24. I've told her to take Benadryl in the evening to see if this helps to dry the secretions. She has responded to Benadryl in the past.\par \par 4. The patient will followup with another Telehealth next week, to assess her response to the above-noted interventions. At this time, we would like to hold off with any corticosteroid such as prednisone at this time.\par \par The total length of time of visit is as 25 minutes

## 2020-04-25 ENCOUNTER — MESSAGE (OUTPATIENT)
Age: 35
End: 2020-04-25

## 2020-04-29 ENCOUNTER — APPOINTMENT (OUTPATIENT)
Dept: INTERNAL MEDICINE | Facility: CLINIC | Age: 35
End: 2020-04-29
Payer: COMMERCIAL

## 2020-04-29 PROCEDURE — 99213 OFFICE O/P EST LOW 20 MIN: CPT | Mod: 95

## 2020-04-29 NOTE — PHYSICAL EXAM
[Well Nourished] : well nourished [No Acute Distress] : no acute distress [Normal Gait] : normal gait [No Respiratory Distress] : no respiratory distress  [Normal Affect] : the affect was normal [Normal Insight/Judgement] : insight and judgment were intact

## 2020-04-29 NOTE — PLAN
[FreeTextEntry1] : 1. Continued medication as outlined above.\par \par 2. Will now reorder Tessalon Perles to take on an as-needed basis.\par \par 3. She will continue with the combination nasal sprays, as well as her routine aerosols for her underlying treatment of asthma.\par \par 4. She will take Benadryl on an as-needed basis, and the Claritin in the evening.\par \par 5. I have offered the patient a Kenalog injection. We will observe her over the next few days to weeks, to determine the timing of the Kenalog injection. She will wait for his symptoms to worsen if that is going to occur. At that point, she will receive 60 mg intramuscularly.\par \par The total length of this call was 16 minutes.
no

## 2020-04-29 NOTE — HISTORY OF PRESENT ILLNESS
[Medical Office: ([facility])___] : at the medical office located in  [Home] : at home, [unfilled] , at the time of the visit. [Patient] : the patient [Self] : self [de-identified] : Spoke with the patient once again today as a followup. We had a visit last week as well.\par \par At the time of the last visit, the patient had been complaining primarily of symptoms consistent with postnasal drip. We altered her regimen such that she would take the Benadryl at night, and the Claritin during the daytime hours. We also added Flonase, to the Astelin nasal spray. In the past, she has had a significant response to Dymista, but unfortunately it was no longer covered by her insurance.\par \par The patient states, but the symptoms did not really improve that much. In fact, she went back to taking the Claritin in the evening, and the Benadryl during the daytime hours as needed. She still has the postnasal drip. She notes that very infrequently, does she wheeze. She feels at times as if there is congestion in her upper midline chest/throat region. She denies any fevers. There is no purulent sputum. She denies having any reflux symptoms.\par \par The patient states, that she has noted recently, she was exposed to environmental allergens during the daytime hours, that her symptoms appear to be somewhat worse. She has had good response to Tessalon Perles. She denies itchy or watery eyes.

## 2020-05-15 ENCOUNTER — APPOINTMENT (OUTPATIENT)
Dept: INTERNAL MEDICINE | Facility: CLINIC | Age: 35
End: 2020-05-15

## 2020-05-15 ENCOUNTER — APPOINTMENT (OUTPATIENT)
Dept: INTERNAL MEDICINE | Facility: CLINIC | Age: 35
End: 2020-05-15
Payer: COMMERCIAL

## 2020-05-15 VITALS
WEIGHT: 235 LBS | RESPIRATION RATE: 18 BRPM | SYSTOLIC BLOOD PRESSURE: 102 MMHG | OXYGEN SATURATION: 98 % | HEART RATE: 96 BPM | HEIGHT: 63 IN | DIASTOLIC BLOOD PRESSURE: 70 MMHG | BODY MASS INDEX: 41.64 KG/M2 | TEMPERATURE: 98.4 F

## 2020-05-15 PROCEDURE — 96372 THER/PROPH/DIAG INJ SC/IM: CPT

## 2020-05-15 PROCEDURE — 99214 OFFICE O/P EST MOD 30 MIN: CPT | Mod: 25

## 2020-05-15 RX ORDER — TRIAMCINOLONE ACETONIDE 40 MG/ML
40 SUSPENSION INTRA-ARTERIAL; INTRAMUSCULAR
Qty: 6 | Refills: 0 | Status: COMPLETED | OUTPATIENT
Start: 2020-05-15

## 2020-05-15 RX ADMIN — TRIAMCINOLONE ACETONIDE 1.5 MG/ML: 40 INJECTION, SUSPENSION INTRA-ARTICULAR; INTRAMUSCULAR at 00:00

## 2020-05-15 NOTE — PHYSICAL EXAM
[General Appearance - Alert] : alert [Extraocular Movements] : extraocular movements were intact [PERRL With Normal Accommodation] : pupils were equal in size, round, and reactive to light [Sclera] : the sclera and conjunctiva were normal [General Appearance - In No Acute Distress] : in no acute distress [Oropharynx] : the oropharynx was normal [Nasal Cavity] : the nasal mucosa and septum were normal [Neck Cervical Mass (___cm)] : no neck mass was observed [Neck Appearance] : the appearance of the neck was normal [Jugular Venous Distention Increased] : there was no jugular-venous distention [] : no respiratory distress [Auscultation Breath Sounds / Voice Sounds] : lungs were clear to auscultation bilaterally [Heart Sounds] : normal S1 and S2 [Heart Sounds Gallop] : no gallops [Heart Rate And Rhythm] : heart rate was normal and rhythm regular [Edema] : there was no peripheral edema [Heart Sounds Pericardial Friction Rub] : no pericardial rub [Arterial Pulses Carotid] : carotid pulses were normal with no bruits [Supraclavicular Lymph Nodes Enlarged Bilaterally] : supraclavicular [Cervical Lymph Nodes Enlarged Posterior Bilaterally] : posterior cervical [Cervical Lymph Nodes Enlarged Anterior Bilaterally] : anterior cervical [No CVA Tenderness] : no ~M costovertebral angle tenderness [No Spinal Tenderness] : no spinal tenderness [Nail Clubbing] : no clubbing  or cyanosis of the fingernails [FreeTextEntry1] : There is a 1/6 systolic ejection murmur at the left sternal border

## 2020-05-15 NOTE — PLAN
[FreeTextEntry1] : 1. Continue with medication as outlined above.\par \par 2. Continue with antihistamines including Benadryl and Claritin.\par \par 3. Maintain nasal sprays with Astelin and Flonase\par \par 4. Kenalog 60 mg IM x1 now.\par \par 5. Will hold off on the administration of antibiotics at this time. We will monitor her closely from a clinical standpoint.\par \par 6. Followup in October with full pulmonary function testing.

## 2020-05-15 NOTE — HISTORY OF PRESENT ILLNESS
[de-identified] : The patient comes in today for an evaluation. There are several issues to discuss.\par \par The patient has been having issues of the past several weeks. She has been having increased cough. The cough for the most part, has been primarily dry and nonproductive. She hasn't been experimenting with different antihistamines, including Benadryl and Claritin. She now feels as if she gets the most relief from taking the Claritin in the evening, and Benadryl during the daytime hours. The Benadryl does not seem to cause much drowsiness when she takes it. She was in frequently wheezing over the past few weeks. She feels as if there was congestion in her mid upper chest region.\par \par The patient over the past 24-48 hours, has had an increase in her "throat clearing." She feels as if the postnasal drip has worsened, and that some of the secretions have now settled in her chest. She has had scant amounts of yellow sputum. She feels a slight tickle in her throat when she gets coughing paroxysms. She has taken Tessalon Perles with good results. She doesn't feel as if she is sick. She actually notes having increased itching of her skin. As soon as she gets outside, she notices that her symptoms appeared to worsen. Interestingly, she usually does not have springtime allergy type symptoms. She now comes in for this assessment.

## 2020-05-29 ENCOUNTER — APPOINTMENT (OUTPATIENT)
Dept: SLEEP CENTER | Facility: CLINIC | Age: 35
End: 2020-05-29

## 2020-06-12 ENCOUNTER — APPOINTMENT (OUTPATIENT)
Dept: SLEEP CENTER | Facility: CLINIC | Age: 35
End: 2020-06-12

## 2020-06-29 ENCOUNTER — RX RENEWAL (OUTPATIENT)
Age: 35
End: 2020-06-29

## 2020-08-24 ENCOUNTER — RX RENEWAL (OUTPATIENT)
Age: 35
End: 2020-08-24

## 2020-09-18 ENCOUNTER — APPOINTMENT (OUTPATIENT)
Dept: INTERNAL MEDICINE | Facility: CLINIC | Age: 35
End: 2020-09-18

## 2020-09-25 ENCOUNTER — APPOINTMENT (OUTPATIENT)
Dept: INTERNAL MEDICINE | Facility: CLINIC | Age: 35
End: 2020-09-25
Payer: COMMERCIAL

## 2020-09-25 DIAGNOSIS — Z92.29 PERSONAL HISTORY OF OTHER DRUG THERAPY: ICD-10-CM

## 2020-09-25 PROCEDURE — 99213 OFFICE O/P EST LOW 20 MIN: CPT | Mod: 95

## 2020-09-25 NOTE — REVIEW OF SYSTEMS
[Fever] : no fever [Chills] : no chills [Fatigue] : no fatigue [Negative] : Neurological [FreeTextEntry4] : see HPi  [FreeTextEntry6] : see HPI

## 2020-09-25 NOTE — HISTORY OF PRESENT ILLNESS
[Home] : at home, [unfilled] , at the time of the visit. [Medical Office: ([facility])___] : at the medical office located in  [Verbal consent obtained from patient] : the patient, [unfilled] [FreeTextEntry8] : Pt is a 34 yr. old female with a h/ of moderate asthma, allergic rhinitis, GERD. She reports her  and  were ill over the weekend. She started to feel a sore throat on Saturday night , Sunday am work up to a dry cough and post nasal. She did get COVId tested , which was negative . Her nasal congestion is clear and she has been using Proair 2 puffs once a day in the am for the last few days.  She is maintained on Advair and Singular daily.  She is feeling better now , her sore throat has resolved . Still with mild dry cough , is taking Robitussin and Tessalon Perles. She is requesting a note to return to work . \par She denies fever, chills, shortness or breath or wheeze.

## 2020-09-25 NOTE — ASSESSMENT
[FreeTextEntry1] : continue Advair and Singular daily \par Proair renewed to pt's pharmacy \par Refused any steroids po,  \par work absence written and sent to pt address \par F/up Dr. Soto 10/8 as scheduled \par

## 2020-10-12 ENCOUNTER — APPOINTMENT (OUTPATIENT)
Dept: INTERNAL MEDICINE | Facility: CLINIC | Age: 35
End: 2020-10-12

## 2020-10-23 ENCOUNTER — RX RENEWAL (OUTPATIENT)
Age: 35
End: 2020-10-23

## 2020-11-20 ENCOUNTER — APPOINTMENT (OUTPATIENT)
Dept: INTERNAL MEDICINE | Facility: CLINIC | Age: 35
End: 2020-11-20
Payer: COMMERCIAL

## 2020-11-20 VITALS
BODY MASS INDEX: 43.41 KG/M2 | SYSTOLIC BLOOD PRESSURE: 114 MMHG | WEIGHT: 245 LBS | OXYGEN SATURATION: 98 % | HEART RATE: 88 BPM | DIASTOLIC BLOOD PRESSURE: 72 MMHG | RESPIRATION RATE: 18 BRPM | TEMPERATURE: 98 F | HEIGHT: 63 IN

## 2020-11-20 VITALS
OXYGEN SATURATION: 98 % | HEART RATE: 88 BPM | DIASTOLIC BLOOD PRESSURE: 72 MMHG | TEMPERATURE: 98 F | SYSTOLIC BLOOD PRESSURE: 114 MMHG | RESPIRATION RATE: 18 BRPM

## 2020-11-20 DIAGNOSIS — R09.82 POSTNASAL DRIP: ICD-10-CM

## 2020-11-20 DIAGNOSIS — R06.02 SHORTNESS OF BREATH: ICD-10-CM

## 2020-11-20 PROCEDURE — 99214 OFFICE O/P EST MOD 30 MIN: CPT

## 2020-11-20 RX ORDER — FLUTICASONE PROPIONATE 50 UG/1
50 SPRAY, METERED NASAL TWICE DAILY
Qty: 1 | Refills: 11 | Status: DISCONTINUED | COMMUNITY
Start: 2020-04-29 | End: 2020-11-20

## 2020-11-20 RX ORDER — LORATADINE 5 MG/5 ML
10 SOLUTION, ORAL ORAL DAILY
Qty: 30 | Refills: 0 | Status: ACTIVE | COMMUNITY

## 2020-11-20 RX ORDER — AZELASTINE HYDROCHLORIDE 137 UG/1
0.1 SPRAY, METERED NASAL
Qty: 1 | Refills: 3 | Status: DISCONTINUED | COMMUNITY
Start: 2020-03-09 | End: 2020-11-20

## 2020-11-20 RX ORDER — FLUTICASONE PROPIONATE AND SALMETEROL XINAFOATE 230; 21 UG/1; UG/1
230-21 AEROSOL, METERED RESPIRATORY (INHALATION)
Qty: 3 | Refills: 3 | Status: DISCONTINUED | COMMUNITY
Start: 2019-12-06 | End: 2020-11-20

## 2020-11-20 RX ORDER — TOPIRAMATE 25 MG/1
25 TABLET, FILM COATED ORAL
Qty: 30 | Refills: 0 | Status: ACTIVE | COMMUNITY

## 2020-11-20 RX ORDER — BENZONATATE 100 MG/1
100 CAPSULE ORAL 4 TIMES DAILY
Qty: 80 | Refills: 2 | Status: DISCONTINUED | COMMUNITY
Start: 2020-04-29 | End: 2020-11-20

## 2021-01-09 ENCOUNTER — NON-APPOINTMENT (OUTPATIENT)
Age: 36
End: 2021-01-09

## 2021-01-22 DIAGNOSIS — D22.9 MELANOCYTIC NEVI, UNSPECIFIED: ICD-10-CM

## 2021-01-22 DIAGNOSIS — Z80.8 FAMILY HISTORY OF MALIGNANT NEOPLASM OF OTHER ORGANS OR SYSTEMS: ICD-10-CM

## 2021-01-22 DIAGNOSIS — Z80.3 FAMILY HISTORY OF MALIGNANT NEOPLASM OF BREAST: ICD-10-CM

## 2021-01-24 ENCOUNTER — NON-APPOINTMENT (OUTPATIENT)
Age: 36
End: 2021-01-24

## 2021-01-25 ENCOUNTER — APPOINTMENT (OUTPATIENT)
Dept: INTERNAL MEDICINE | Facility: CLINIC | Age: 36
End: 2021-01-25
Payer: COMMERCIAL

## 2021-01-25 VITALS
OXYGEN SATURATION: 98 % | BODY MASS INDEX: 45 KG/M2 | SYSTOLIC BLOOD PRESSURE: 106 MMHG | WEIGHT: 254 LBS | TEMPERATURE: 96.4 F | DIASTOLIC BLOOD PRESSURE: 68 MMHG | HEART RATE: 83 BPM | HEIGHT: 63 IN

## 2021-01-25 VITALS — DIASTOLIC BLOOD PRESSURE: 60 MMHG | SYSTOLIC BLOOD PRESSURE: 98 MMHG

## 2021-01-25 DIAGNOSIS — E74.39 OTHER DISORDERS OF INTESTINAL CARBOHYDRATE ABSORPTION: ICD-10-CM

## 2021-01-25 DIAGNOSIS — Z78.9 OTHER SPECIFIED HEALTH STATUS: ICD-10-CM

## 2021-01-25 DIAGNOSIS — R00.2 PALPITATIONS: ICD-10-CM

## 2021-01-25 DIAGNOSIS — M50.20 OTHER CERVICAL DISC DISPLACEMENT, UNSPECIFIED CERVICAL REGION: ICD-10-CM

## 2021-01-25 DIAGNOSIS — K58.9 IRRITABLE BOWEL SYNDROME W/OUT DIARRHEA: ICD-10-CM

## 2021-01-25 DIAGNOSIS — R51.9 HEADACHE, UNSPECIFIED: ICD-10-CM

## 2021-01-25 PROCEDURE — 99072 ADDL SUPL MATRL&STAF TM PHE: CPT

## 2021-01-25 PROCEDURE — 99395 PREV VISIT EST AGE 18-39: CPT | Mod: 25

## 2021-01-25 PROCEDURE — 36415 COLL VENOUS BLD VENIPUNCTURE: CPT

## 2021-01-25 NOTE — HEALTH RISK ASSESSMENT
[0] : 2) Feeling down, depressed, or hopeless: Not at all (0) [Smoke Detector] : smoke detector [Carbon Monoxide Detector] : carbon monoxide detector [Guns at Home] : guns at home [Seat Belt] :  uses seat belt [Sunscreen] : uses sunscreen [No] : In the past 12 months have you used drugs other than those required for medical reasons? No [None] : None [With Family] : lives with family [Patient/Caregiver not ready to engage] : Patient/Caregiver not ready to engage [] : No [de-identified] : no [de-identified] : no [IAK1Ergfv] : 0 [EyeExamDate] : 03/2021 [Change in mental status noted] : No change in mental status noted [Reports changes in hearing] : Reports no changes in hearing [Reports changes in dental health] : Reports no changes in dental health [PapSmearComments] : schedule for April 2021 [ColonoscopyDate] : 12/2016 [de-identified] : 1/20/21

## 2021-01-26 DIAGNOSIS — R31.21 ASYMPTOMATIC MICROSCOPIC HEMATURIA: ICD-10-CM

## 2021-01-26 LAB
25(OH)D3 SERPL-MCNC: 33.7 NG/ML
ALBUMIN SERPL ELPH-MCNC: 4.4 G/DL
ALP BLD-CCNC: 99 U/L
ALT SERPL-CCNC: 39 U/L
ANION GAP SERPL CALC-SCNC: 11 MMOL/L
APPEARANCE: ABNORMAL
AST SERPL-CCNC: 27 U/L
BACTERIA: ABNORMAL
BASOPHILS # BLD AUTO: 0.12 K/UL
BASOPHILS NFR BLD AUTO: 1.2 %
BILIRUB SERPL-MCNC: 0.2 MG/DL
BILIRUBIN URINE: NEGATIVE
BLOOD URINE: NEGATIVE
BUN SERPL-MCNC: 12 MG/DL
CALCIUM SERPL-MCNC: 9.2 MG/DL
CHLORIDE SERPL-SCNC: 109 MMOL/L
CHOLEST SERPL-MCNC: 199 MG/DL
CO2 SERPL-SCNC: 18 MMOL/L
COLOR: YELLOW
CREAT SERPL-MCNC: 0.65 MG/DL
EOSINOPHIL # BLD AUTO: 0.18 K/UL
EOSINOPHIL NFR BLD AUTO: 1.8 %
ESTIMATED AVERAGE GLUCOSE: 128 MG/DL
GLUCOSE QUALITATIVE U: NEGATIVE
GLUCOSE SERPL-MCNC: 107 MG/DL
HBA1C MFR BLD HPLC: 6.1 %
HCT VFR BLD CALC: 41.3 %
HDLC SERPL-MCNC: 41 MG/DL
HGB BLD-MCNC: 12.3 G/DL
HYALINE CASTS: 0 /LPF
IMM GRANULOCYTES NFR BLD AUTO: 0.3 %
KETONES URINE: NEGATIVE
LDLC SERPL CALC-MCNC: 127 MG/DL
LEUKOCYTE ESTERASE URINE: NEGATIVE
LYMPHOCYTES # BLD AUTO: 3.38 K/UL
LYMPHOCYTES NFR BLD AUTO: 33.2 %
MAN DIFF?: NORMAL
MCHC RBC-ENTMCNC: 25.5 PG
MCHC RBC-ENTMCNC: 29.8 GM/DL
MCV RBC AUTO: 85.7 FL
MICROSCOPIC-UA: NORMAL
MONOCYTES # BLD AUTO: 0.71 K/UL
MONOCYTES NFR BLD AUTO: 7 %
NEUTROPHILS # BLD AUTO: 5.77 K/UL
NEUTROPHILS NFR BLD AUTO: 56.5 %
NITRITE URINE: NEGATIVE
NONHDLC SERPL-MCNC: 157 MG/DL
PH URINE: 8
PLATELET # BLD AUTO: 337 K/UL
POTASSIUM SERPL-SCNC: 4.2 MMOL/L
PROT SERPL-MCNC: 7.6 G/DL
PROTEIN URINE: NORMAL
RBC # BLD: 4.82 M/UL
RBC # FLD: 14.6 %
RED BLOOD CELLS URINE: 10 /HPF
SODIUM SERPL-SCNC: 138 MMOL/L
SPECIFIC GRAVITY URINE: 1.03
SQUAMOUS EPITHELIAL CELLS: 8 /HPF
T4 SERPL-MCNC: 6 UG/DL
TRIGL SERPL-MCNC: 152 MG/DL
TSH SERPL-ACNC: 1.84 UIU/ML
UROBILINOGEN URINE: NORMAL
WBC # FLD AUTO: 10.19 K/UL
WHITE BLOOD CELLS URINE: 2 /HPF

## 2021-01-28 LAB
M TB IFN-G BLD-IMP: NEGATIVE
QUANTIFERON TB PLUS MITOGEN MINUS NIL: 9.1 IU/ML
QUANTIFERON TB PLUS NIL: 0.03 IU/ML
QUANTIFERON TB PLUS TB1 MINUS NIL: -0.01 IU/ML
QUANTIFERON TB PLUS TB2 MINUS NIL: -0.01 IU/ML

## 2021-01-29 ENCOUNTER — NON-APPOINTMENT (OUTPATIENT)
Age: 36
End: 2021-01-29

## 2021-02-01 LAB
TESTOST BND SERPL-MCNC: 1.3 PG/ML
TESTOST SERPL-MCNC: 15.3 NG/DL

## 2021-02-26 ENCOUNTER — OUTPATIENT (OUTPATIENT)
Dept: OUTPATIENT SERVICES | Facility: HOSPITAL | Age: 36
LOS: 1 days | End: 2021-02-26
Payer: COMMERCIAL

## 2021-02-26 DIAGNOSIS — G47.33 OBSTRUCTIVE SLEEP APNEA (ADULT) (PEDIATRIC): ICD-10-CM

## 2021-02-26 PROCEDURE — 95806 SLEEP STUDY UNATT&RESP EFFT: CPT | Mod: 26

## 2021-02-26 PROCEDURE — G0399: CPT

## 2021-03-05 ENCOUNTER — MED ADMIN CHARGE (OUTPATIENT)
Age: 36
End: 2021-03-05

## 2021-03-05 ENCOUNTER — APPOINTMENT (OUTPATIENT)
Dept: INTERNAL MEDICINE | Facility: CLINIC | Age: 36
End: 2021-03-05
Payer: COMMERCIAL

## 2021-03-05 PROCEDURE — 90732 PPSV23 VACC 2 YRS+ SUBQ/IM: CPT

## 2021-03-05 PROCEDURE — G0009: CPT

## 2021-03-05 PROCEDURE — 99072 ADDL SUPL MATRL&STAF TM PHE: CPT

## 2021-05-04 ENCOUNTER — APPOINTMENT (OUTPATIENT)
Dept: INTERNAL MEDICINE | Facility: CLINIC | Age: 36
End: 2021-05-04
Payer: COMMERCIAL

## 2021-05-04 PROCEDURE — 99213 OFFICE O/P EST LOW 20 MIN: CPT | Mod: 95

## 2021-05-04 RX ORDER — BENZONATATE 100 MG/1
100 CAPSULE ORAL 3 TIMES DAILY
Qty: 30 | Refills: 1 | Status: COMPLETED | COMMUNITY
Start: 2019-12-19 | End: 2021-05-24

## 2021-05-04 NOTE — REVIEW OF SYSTEMS
[Fever] : no fever [Chills] : no chills [Fatigue] : no fatigue [Shortness Of Breath] : no shortness of breath [Cough] : cough [Negative] : Psychiatric [FreeTextEntry6] : see HPI

## 2021-05-04 NOTE — HISTORY OF PRESENT ILLNESS
[Home] : at home, [unfilled] , at the time of the visit. [Medical Office: ([facility])___] : at the medical office located in  [Verbal consent obtained from patient] : the patient, [unfilled] [FreeTextEntry8] : Pt is a 35 yr.old female with a h/ of moderate asthma, allergic rhinitis, GERD, sleep apnea, migraines. She reports her daughter has been ill at day care with URI symptoms, took her for COVID PCR , negative result. Pt developed sore throat , sinus congestion an d cough over the weekend. Initially her nasal congestion was yellow and thick, now it is almost resolved. She continues with cough , dry and hacking. She has started suing Albuterol via neb twice a day which is helping. She also just restarted Advair BID. She does not want to go on steroids, feels she always has " trouble coming off them  and doesn’t like the side effects. She had a small amount of Cheratussin cough syrup and has been taking that with good effect. \par She denies  fever,chills, SOB,  chest pain, palpitations. She received both COVID vaccines . \par

## 2021-05-04 NOTE — ASSESSMENT
[FreeTextEntry1] : asthma,  tracheitis\par Tessalon Perles TID PRN\par Guaiatussin AC 5 ml q 4-6 hrs. \par Continue Advair BID, \par increase Albuterol via neb QID PRN \par Discussed may need steroids iif sx's not resolving \par To ER with emergent sx's \par  \par \par \par

## 2021-05-10 ENCOUNTER — APPOINTMENT (OUTPATIENT)
Dept: INTERNAL MEDICINE | Facility: CLINIC | Age: 36
End: 2021-05-10
Payer: COMMERCIAL

## 2021-05-10 ENCOUNTER — MED ADMIN CHARGE (OUTPATIENT)
Age: 36
End: 2021-05-10

## 2021-05-10 PROCEDURE — 99072 ADDL SUPL MATRL&STAF TM PHE: CPT

## 2021-05-10 RX ORDER — TRIAMCINOLONE ACETONIDE 40 MG/ML
40 SUSPENSION INTRA-ARTERIAL; INTRAMUSCULAR
Qty: 1 | Refills: 0 | Status: COMPLETED | OUTPATIENT
Start: 2021-05-10

## 2021-05-17 ENCOUNTER — RX RENEWAL (OUTPATIENT)
Age: 36
End: 2021-05-17

## 2021-05-28 DIAGNOSIS — Z20.828 CONTACT WITH AND (SUSPECTED) EXPOSURE TO OTHER VIRAL COMMUNICABLE DISEASES: ICD-10-CM

## 2021-06-04 ENCOUNTER — APPOINTMENT (OUTPATIENT)
Dept: INTERNAL MEDICINE | Facility: CLINIC | Age: 36
End: 2021-06-04
Payer: COMMERCIAL

## 2021-06-04 VITALS
HEART RATE: 89 BPM | TEMPERATURE: 98 F | DIASTOLIC BLOOD PRESSURE: 74 MMHG | OXYGEN SATURATION: 97 % | HEIGHT: 63 IN | SYSTOLIC BLOOD PRESSURE: 116 MMHG | WEIGHT: 250 LBS | BODY MASS INDEX: 44.3 KG/M2 | RESPIRATION RATE: 16 BRPM

## 2021-06-04 PROCEDURE — 99214 OFFICE O/P EST MOD 30 MIN: CPT

## 2021-06-04 PROCEDURE — 99072 ADDL SUPL MATRL&STAF TM PHE: CPT

## 2021-06-04 NOTE — HISTORY OF PRESENT ILLNESS
[FreeTextEntry1] : The patient comes in today for a followup evaluation.\par  [de-identified] : Patient states that she has not been feeling well over the past several weeks. She notes that her daughter was sick with several back-to-back viral syndromes over approximately a 5-6 week period. The patient began to not feel well he made to the end of April. She had discontinued the use of her Advair metered-dose inhaler at the end of March as per our plan. She was seen in the office on 5/4/21. She was treated primarily with Tessalon Perles and Hycodan. The symptoms gradually improved, but they did not completely resolve. She again noted the onset of nasal congestion over the past several days. Her chest became congested. She was using a nebulizer and a rescue inhaler over the weekend. The cough worsens. She started prednisone on 5/29/21, and a dosage of 40 mg daily. She has not yet tapered. The cough has improved. She did receive Kenalog approximately 3 weeks ago as an outpatient. The sputum remains thick but it is clear.\par \par The patient states that she also underwent an overnight polysomnogram due to the fact that she was having morning headaches. The headaches have diminished, but the home sleep study was noted to be mildly to moderately abnormal. She also did have desaturations. She now comes in for assessment.

## 2021-06-04 NOTE — PLAN
[FreeTextEntry1] : 1. The patient will continue with the Advair 2 puffs b.i.d.\par \par 2. The patient now continue with a prednisone taper in which she will take 20 mg daily for 7 days, followed by 10 mg daily for 10 days.\par \par 3. I have ordered an auto Pap device for the patient to start in order to treat her obstructive sleep apnea\par \par 4. The patient will return within the next month for physiologic pulmonary function studies.\par \par 5. The patient continued with cough suppression with Tessalon Perles and Robitussin with codeine\par \par 6. Followup in 6 months with pre-post-spirometry and O2 saturation.

## 2021-06-15 ENCOUNTER — RX RENEWAL (OUTPATIENT)
Age: 36
End: 2021-06-15

## 2021-06-18 NOTE — PLAN
[FreeTextEntry1] : 1. Continue with medication as outlined above.\par \par 2. The patient has been told to reinstitute her Advair for maintenance, as she is now entering into the peak season when she usually has the most pronounced symptomatology. To this end, she will take 2 puffs once a day. She will continue with aggressive oral hygiene.\par \par 3. Continue with omeprazole for silent reflux symptoms\par \par 4. The patient still needs to undergo an overnight polysomnogram. She will attempt to obtain a home study\par \par 5. Followup in 6 months with full pulmonary function testing.\par \par 6. A cardiovascular exercise regimen has been recommended.

## 2021-06-18 NOTE — PHYSICAL EXAM
[General Appearance - Alert] : alert [General Appearance - In No Acute Distress] : in no acute distress [Sclera] : the sclera and conjunctiva were normal [PERRL With Normal Accommodation] : pupils were equal in size, round, and reactive to light [Extraocular Movements] : extraocular movements were intact [Oropharynx] : the oropharynx was normal [Neck Appearance] : the appearance of the neck was normal [Neck Cervical Mass (___cm)] : no neck mass was observed [Jugular Venous Distention Increased] : there was no jugular-venous distention [] : no respiratory distress [Auscultation Breath Sounds / Voice Sounds] : lungs were clear to auscultation bilaterally [Heart Rate And Rhythm] : heart rate was normal and rhythm regular [Heart Sounds] : normal S1 and S2 [Heart Sounds Gallop] : no gallops [Heart Sounds Pericardial Friction Rub] : no pericardial rub [Arterial Pulses Carotid] : carotid pulses were normal with no bruits [Edema] : there was no peripheral edema [Cervical Lymph Nodes Enlarged Posterior Bilaterally] : posterior cervical [Cervical Lymph Nodes Enlarged Anterior Bilaterally] : anterior cervical [Supraclavicular Lymph Nodes Enlarged Bilaterally] : supraclavicular [No CVA Tenderness] : no ~M costovertebral angle tenderness [No Spinal Tenderness] : no spinal tenderness [Nail Clubbing] : no clubbing  or cyanosis of the fingernails [FreeTextEntry1] : There is a 1/6 systolic ejection murmur at the left sternal border

## 2021-06-18 NOTE — HISTORY OF PRESENT ILLNESS
[de-identified] : Comes in today for followup evaluation, and reassessment of her pulmonary status.\par \par At this time, she states that she is doing extremely well from a pulmonary standpoint. She has had a fairly eventful past several months. She actually discontinued her Advair in June on her own. She usually reinstitutes the medication during the late fall, due to the fact that the majority of his symptoms usually occur in the winter. She has remained on Claritin and Singulair. She did have mild URI-type symptoms including nasal congestion and a slight cough at the end of September, but the symptoms resolved on their own. She did undergo a corona virus nasal swab which was negative. She has not required the use of her rescue inhaler. She has not yet restarted the Advair.\par \par The patient notes that it has been very compliant with her omeprazole. She did not think that she had reflux, but she has noted that there has been a significant decrease in knee "throat clearing" episodes that she was having. She now notes that if she skips her omeprazole, that she does feel some actual burning in her chest. She does think that overall it is helping.\par \par Lastly, she has not yet undergone a home polysomnogram to evaluate for possible obstructive sleep apnea. She complains of excessive daytime somnolence, in addition to labs snoring. She now comes in for this assessment

## 2021-07-12 ENCOUNTER — APPOINTMENT (OUTPATIENT)
Dept: INTERNAL MEDICINE | Facility: CLINIC | Age: 36
End: 2021-07-12

## 2021-08-30 ENCOUNTER — APPOINTMENT (OUTPATIENT)
Dept: INTERNAL MEDICINE | Facility: CLINIC | Age: 36
End: 2021-08-30
Payer: COMMERCIAL

## 2021-08-30 VITALS
OXYGEN SATURATION: 98 % | TEMPERATURE: 98.8 F | SYSTOLIC BLOOD PRESSURE: 110 MMHG | DIASTOLIC BLOOD PRESSURE: 61 MMHG | WEIGHT: 248 LBS | RESPIRATION RATE: 18 BRPM | BODY MASS INDEX: 43.94 KG/M2 | HEART RATE: 98 BPM | HEIGHT: 63 IN

## 2021-08-30 PROCEDURE — 99214 OFFICE O/P EST MOD 30 MIN: CPT

## 2021-08-30 RX ORDER — GUAIFENESIN AND CODEINE PHOSPHATE 10; 100 MG/5ML; MG/5ML
100-10 LIQUID ORAL
Qty: 240 | Refills: 0 | Status: DISCONTINUED | COMMUNITY
Start: 2018-08-10 | End: 2021-08-30

## 2021-08-30 RX ORDER — AMOXICILLIN AND CLAVULANATE POTASSIUM 875; 125 MG/1; MG/1
875-125 TABLET, COATED ORAL
Qty: 14 | Refills: 0 | Status: COMPLETED | COMMUNITY
Start: 2021-07-10

## 2021-08-30 RX ORDER — PREDNISONE 20 MG/1
20 TABLET ORAL
Qty: 18 | Refills: 0 | Status: COMPLETED | COMMUNITY
Start: 2021-08-30 | End: 2021-09-08

## 2021-08-30 NOTE — PHYSICAL EXAM
[No Respiratory Distress] : no respiratory distress  [No Accessory Muscle Use] : no accessory muscle use [No Edema] : there was no peripheral edema [Normal] : affect was normal and insight and judgment were intact [de-identified] : expiratory wheezes BL

## 2021-08-30 NOTE — HISTORY OF PRESENT ILLNESS
[FreeTextEntry8] : asthma\par Having increasing wheeze a week ago.  She restarted her Advair inhaler which she normally starts in the fall.  Few days later she had migraine and diarrhea.  Went to [facility] with neg rapid test. NOtified today   PCR neg.Was placed on prednisone 40 mg x 5 days which she will complete tomorrow.  NO significant change.  NO fevers,chills or nocturnal symptoms.    Cough productive of clear or white sputum.   + pnd- since resolved.

## 2021-09-02 ENCOUNTER — NON-APPOINTMENT (OUTPATIENT)
Age: 36
End: 2021-09-02

## 2021-09-08 ENCOUNTER — NON-APPOINTMENT (OUTPATIENT)
Age: 36
End: 2021-09-08

## 2021-09-08 DIAGNOSIS — B37.0 CANDIDAL STOMATITIS: ICD-10-CM

## 2021-11-16 ENCOUNTER — RX RENEWAL (OUTPATIENT)
Age: 36
End: 2021-11-16

## 2021-11-22 ENCOUNTER — APPOINTMENT (OUTPATIENT)
Dept: INTERNAL MEDICINE | Facility: CLINIC | Age: 36
End: 2021-11-22
Payer: COMMERCIAL

## 2021-11-22 VITALS
HEART RATE: 80 BPM | SYSTOLIC BLOOD PRESSURE: 120 MMHG | WEIGHT: 248 LBS | OXYGEN SATURATION: 97 % | BODY MASS INDEX: 43.94 KG/M2 | TEMPERATURE: 97.3 F | HEIGHT: 63 IN | DIASTOLIC BLOOD PRESSURE: 78 MMHG | RESPIRATION RATE: 16 BRPM

## 2021-11-22 PROCEDURE — 99214 OFFICE O/P EST MOD 30 MIN: CPT

## 2021-11-22 RX ORDER — CLOTRIMAZOLE 10 MG/1
10 LOZENGE ORAL DAILY
Qty: 50 | Refills: 1 | Status: DISCONTINUED | COMMUNITY
Start: 2021-09-08 | End: 2021-11-22

## 2021-11-22 RX ORDER — DOXYCYCLINE HYCLATE 100 MG/1
100 CAPSULE ORAL TWICE DAILY
Qty: 14 | Refills: 0 | Status: COMPLETED | COMMUNITY
Start: 2021-11-22 | End: 2021-11-29

## 2021-11-22 RX ORDER — PREDNISONE 20 MG/1
20 TABLET ORAL
Qty: 18 | Refills: 0 | Status: COMPLETED | COMMUNITY
Start: 2021-11-22 | End: 2021-12-01

## 2021-11-22 NOTE — PHYSICAL EXAM
[Normal Rate] : normal rate  [Regular Rhythm] : with a regular rhythm [Normal S1, S2] : normal S1 and S2 [No Edema] : there was no peripheral edema [No Extremity Clubbing/Cyanosis] : no extremity clubbing/cyanosis [Normal] : affect was normal and insight and judgment were intact

## 2021-11-22 NOTE — HISTORY OF PRESENT ILLNESS
[FreeTextEntry8] : Asthma\par \par Started 2 weeks ago with sinus congestion and PND. No treatment other than usual txs with gradual improvement however began to cough more past few days.    Cough was dry and hacking but productive at times. +PND.  Wheezing intermittently.  Using rescue 2-3 qd.  No fevers. Taking benadryl HS and tessalon perles with mixed benefit.

## 2021-11-22 NOTE — PLAN
[FreeTextEntry1] : \par \par Start Doxycycline 100 mg bid 7 days\par Prednisone  60 mg taper 9 days\par Strict GERD precautions.  \par FU if not improved.

## 2021-11-29 ENCOUNTER — NON-APPOINTMENT (OUTPATIENT)
Age: 36
End: 2021-11-29

## 2021-12-14 ENCOUNTER — RX RENEWAL (OUTPATIENT)
Age: 36
End: 2021-12-14

## 2021-12-17 ENCOUNTER — APPOINTMENT (OUTPATIENT)
Dept: INTERNAL MEDICINE | Facility: CLINIC | Age: 36
End: 2021-12-17

## 2021-12-19 LAB
M TB IFN-G BLD-IMP: NEGATIVE
QUANTIFERON TB PLUS MITOGEN MINUS NIL: 9.26 IU/ML
QUANTIFERON TB PLUS NIL: 0.01 IU/ML
QUANTIFERON TB PLUS TB1 MINUS NIL: 0 IU/ML
QUANTIFERON TB PLUS TB2 MINUS NIL: 0.01 IU/ML

## 2021-12-20 ENCOUNTER — NON-APPOINTMENT (OUTPATIENT)
Age: 36
End: 2021-12-20

## 2022-01-07 ENCOUNTER — APPOINTMENT (OUTPATIENT)
Age: 37
End: 2022-01-07
Payer: COMMERCIAL

## 2022-01-07 DIAGNOSIS — Z13.9 ENCOUNTER FOR SCREENING, UNSPECIFIED: ICD-10-CM

## 2022-01-07 DIAGNOSIS — J45.909 UNSPECIFIED ASTHMA, UNCOMPLICATED: ICD-10-CM

## 2022-01-07 PROCEDURE — 99213 OFFICE O/P EST LOW 20 MIN: CPT | Mod: 95

## 2022-01-07 RX ORDER — MULTIVIT-MIN/IRON/FOLIC ACID/K 18-600-40
CAPSULE ORAL
Refills: 0 | Status: COMPLETED | COMMUNITY
End: 2022-01-07

## 2022-01-07 RX ORDER — CODEINE PHOSPHATE AND GUAIFENESIN 10; 200 MG/5ML; MG/5ML
200-10 SYRUP ORAL
Qty: 1 | Refills: 0 | Status: COMPLETED | COMMUNITY
Start: 2021-11-24 | End: 2022-01-07

## 2022-01-07 RX ORDER — BENZONATATE 100 MG/1
100 CAPSULE ORAL 4 TIMES DAILY
Qty: 80 | Refills: 1 | Status: COMPLETED | COMMUNITY
Start: 2021-06-04 | End: 2022-01-07

## 2022-01-07 RX ORDER — CODEINE PHOSPHATE AND GUAIFENESIN 10; 100 MG/5ML; MG/5ML
100-10 LIQUID ORAL
Qty: 60 | Refills: 0 | Status: COMPLETED | COMMUNITY
Start: 2021-11-27 | End: 2022-01-07

## 2022-01-07 NOTE — PHYSICAL EXAM
[No Acute Distress] : no acute distress [Well Nourished] : well nourished [Well Developed] : well developed [No Respiratory Distress] : no respiratory distress  [Normal Affect] : the affect was normal [Alert and Oriented x3] : oriented to person, place, and time [de-identified] : obese

## 2022-01-07 NOTE — HISTORY OF PRESENT ILLNESS
[Home] : at home, [unfilled] , at the time of the visit. [Medical Office: ([facility])___] : at the medical office located in  [FreeTextEntry1] : f/up  [de-identified] : Pt is a 36 yr old female with a h/ o of moderate persistent asthma, IBS, morbid obesity, sleep apnea. 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 She did have a QuantiFERON-TB which resulted negative. \par She feels well. Her asthma is stable. Remains on  Advair  mcg 1 puff BID, has not had to use her rescue inhaler recently. \par denies fever, chills, chest pain , shortness of  breath, palpitations.

## 2022-01-07 NOTE — ASSESSMENT
[FreeTextEntry1] : 1. health form to be completed \par Left message for pt to send as PDF in email \par 2. asthma \par stable , continue Advair BID\par OV Dr. Chen 3 months \par

## 2022-01-12 ENCOUNTER — RX RENEWAL (OUTPATIENT)
Age: 37
End: 2022-01-12

## 2022-05-12 ENCOUNTER — NON-APPOINTMENT (OUTPATIENT)
Age: 37
End: 2022-05-12

## 2022-05-12 DIAGNOSIS — Z20.822 CONTACT WITH AND (SUSPECTED) EXPOSURE TO COVID-19: ICD-10-CM

## 2022-05-17 ENCOUNTER — RX RENEWAL (OUTPATIENT)
Age: 37
End: 2022-05-17

## 2022-05-26 ENCOUNTER — NON-APPOINTMENT (OUTPATIENT)
Age: 37
End: 2022-05-26

## 2022-05-26 ENCOUNTER — APPOINTMENT (OUTPATIENT)
Dept: INTERNAL MEDICINE | Facility: CLINIC | Age: 37
End: 2022-05-26
Payer: COMMERCIAL

## 2022-05-26 VITALS
DIASTOLIC BLOOD PRESSURE: 80 MMHG | OXYGEN SATURATION: 96 % | RESPIRATION RATE: 16 BRPM | TEMPERATURE: 98.2 F | SYSTOLIC BLOOD PRESSURE: 100 MMHG | HEIGHT: 63 IN | HEART RATE: 67 BPM | BODY MASS INDEX: 43.66 KG/M2 | WEIGHT: 246.38 LBS

## 2022-05-26 DIAGNOSIS — Z00.00 ENCOUNTER FOR GENERAL ADULT MEDICAL EXAMINATION W/OUT ABNORMAL FINDINGS: ICD-10-CM

## 2022-05-26 DIAGNOSIS — E55.9 VITAMIN D DEFICIENCY, UNSPECIFIED: ICD-10-CM

## 2022-05-26 DIAGNOSIS — E78.2 MIXED HYPERLIPIDEMIA: ICD-10-CM

## 2022-05-26 PROCEDURE — 99395 PREV VISIT EST AGE 18-39: CPT | Mod: 25

## 2022-05-26 PROCEDURE — 99215 OFFICE O/P EST HI 40 MIN: CPT | Mod: 25

## 2022-05-26 NOTE — HISTORY OF PRESENT ILLNESS
[FreeTextEntry1] : CPE [de-identified] : Patient is a 36- year -old female with PMH of asthma, GERD, migraines, HLD, morbid obesity, vitamin D deficiency presents to office today for yearly physical. \par \par Daughter had COVID last week - reports she stayed negative was tested several times \par \par Patient has been watching diet- tracking macros- using food journal- low carb high protein- down 13 pounds. Will begin exercise routine in near future \par \par UTD with covid shots, last booster in December x1\par \par Last pap- UTD- yearly\par \par Has not started mammograms \par \par ortho next week R arm seeing ortho next week bicep tendonitis- Aleve Advil helps a little - reports pain with certain movements \par \par In regards to her asthma, she reports she has been well controlled. She has not needed to use her rescue inhaler. Remains compliant with Advair and Singulair. \par \par Feels well today\par

## 2022-05-26 NOTE — PHYSICAL EXAM
[No Acute Distress] : no acute distress [Well Developed] : well developed [Normal Oropharynx] : the oropharynx was normal [Normal TMs] : both tympanic membranes were normal [No Lymphadenopathy] : no lymphadenopathy [Supple] : supple [No Respiratory Distress] : no respiratory distress  [No Accessory Muscle Use] : no accessory muscle use [Clear to Auscultation] : lungs were clear to auscultation bilaterally [Normal Rate] : normal rate  [Regular Rhythm] : with a regular rhythm [Normal S1, S2] : normal S1 and S2 [No Edema] : there was no peripheral edema [Soft] : abdomen soft [Non Tender] : non-tender [Non-distended] : non-distended [Normal Bowel Sounds] : normal bowel sounds [No Rash] : no rash [Normal Gait] : normal gait [Normal Affect] : the affect was normal [Alert and Oriented x3] : oriented to person, place, and time

## 2022-05-26 NOTE — PLAN
[FreeTextEntry1] : 1. All medications renewed at patients request. \par \par 2. Patient provided with rx for fasting blood work to be done \par \par 3. Patient will continue with healthy choices regarding diet and exercise\par \par 4. Patient will return to office over summer for PFT and in fall for pulmonary follow up\par \par All questions answered. Agrees with above

## 2022-06-26 ENCOUNTER — RX RENEWAL (OUTPATIENT)
Age: 37
End: 2022-06-26

## 2022-07-08 ENCOUNTER — APPOINTMENT (OUTPATIENT)
Dept: INTERNAL MEDICINE | Facility: CLINIC | Age: 37
End: 2022-07-08

## 2022-07-08 VITALS
BODY MASS INDEX: 42.52 KG/M2 | SYSTOLIC BLOOD PRESSURE: 106 MMHG | OXYGEN SATURATION: 98 % | WEIGHT: 240 LBS | HEART RATE: 84 BPM | DIASTOLIC BLOOD PRESSURE: 71 MMHG | RESPIRATION RATE: 16 BRPM | HEIGHT: 63 IN | TEMPERATURE: 98.8 F

## 2022-07-08 PROCEDURE — 94060 EVALUATION OF WHEEZING: CPT

## 2022-07-08 PROCEDURE — 94727 GAS DIL/WSHOT DETER LNG VOL: CPT

## 2022-07-08 PROCEDURE — 94729 DIFFUSING CAPACITY: CPT

## 2022-07-11 ENCOUNTER — NON-APPOINTMENT (OUTPATIENT)
Age: 37
End: 2022-07-11

## 2022-07-22 DIAGNOSIS — D50.9 IRON DEFICIENCY ANEMIA, UNSPECIFIED: ICD-10-CM

## 2022-07-27 PROBLEM — D50.9 IRON DEFICIENCY ANEMIA: Status: ACTIVE | Noted: 2022-07-27

## 2022-07-27 LAB — HBA1C MFR BLD HPLC: 6.4

## 2022-07-28 ENCOUNTER — NON-APPOINTMENT (OUTPATIENT)
Age: 37
End: 2022-07-28

## 2022-09-14 ENCOUNTER — NON-APPOINTMENT (OUTPATIENT)
Age: 37
End: 2022-09-14

## 2022-10-18 ENCOUNTER — EMERGENCY (EMERGENCY)
Facility: HOSPITAL | Age: 37
LOS: 0 days | Discharge: HOME | End: 2022-10-19
Attending: EMERGENCY MEDICINE
Payer: COMMERCIAL

## 2022-10-18 VITALS — HEART RATE: 105 BPM | HEIGHT: 63 IN | RESPIRATION RATE: 22 BRPM | OXYGEN SATURATION: 97 %

## 2022-10-18 DIAGNOSIS — G93.5 COMPRESSION OF BRAIN: ICD-10-CM

## 2022-10-18 DIAGNOSIS — J02.9 ACUTE PHARYNGITIS, UNSPECIFIED: ICD-10-CM

## 2022-10-18 DIAGNOSIS — E28.2 POLYCYSTIC OVARIAN SYNDROME: ICD-10-CM

## 2022-10-18 DIAGNOSIS — J06.9 ACUTE UPPER RESPIRATORY INFECTION, UNSPECIFIED: ICD-10-CM

## 2022-10-18 DIAGNOSIS — G43.909 MIGRAINE, UNSPECIFIED, NOT INTRACTABLE, WITHOUT STATUS MIGRAINOSUS: ICD-10-CM

## 2022-10-18 DIAGNOSIS — J45.909 UNSPECIFIED ASTHMA, UNCOMPLICATED: ICD-10-CM

## 2022-10-18 DIAGNOSIS — R07.0 PAIN IN THROAT: ICD-10-CM

## 2022-10-18 DIAGNOSIS — R13.10 DYSPHAGIA, UNSPECIFIED: ICD-10-CM

## 2022-10-18 DIAGNOSIS — M79.10 MYALGIA, UNSPECIFIED SITE: ICD-10-CM

## 2022-10-18 PROCEDURE — 96375 TX/PRO/DX INJ NEW DRUG ADDON: CPT

## 2022-10-18 PROCEDURE — 99284 EMERGENCY DEPT VISIT MOD MDM: CPT

## 2022-10-18 PROCEDURE — 85025 COMPLETE CBC W/AUTO DIFF WBC: CPT

## 2022-10-18 PROCEDURE — 80053 COMPREHEN METABOLIC PANEL: CPT

## 2022-10-18 PROCEDURE — 84702 CHORIONIC GONADOTROPIN TEST: CPT

## 2022-10-18 PROCEDURE — 99284 EMERGENCY DEPT VISIT MOD MDM: CPT | Mod: 25

## 2022-10-18 PROCEDURE — 96374 THER/PROPH/DIAG INJ IV PUSH: CPT

## 2022-10-18 PROCEDURE — 36415 COLL VENOUS BLD VENIPUNCTURE: CPT

## 2022-10-18 RX ORDER — SODIUM CHLORIDE 9 MG/ML
1000 INJECTION INTRAMUSCULAR; INTRAVENOUS; SUBCUTANEOUS ONCE
Refills: 0 | Status: COMPLETED | OUTPATIENT
Start: 2022-10-18 | End: 2022-10-18

## 2022-10-18 RX ORDER — KETOROLAC TROMETHAMINE 30 MG/ML
30 SYRINGE (ML) INJECTION ONCE
Refills: 0 | Status: DISCONTINUED | OUTPATIENT
Start: 2022-10-18 | End: 2022-10-18

## 2022-10-18 RX ORDER — LIDOCAINE 4 G/100G
10 CREAM TOPICAL ONCE
Refills: 0 | Status: COMPLETED | OUTPATIENT
Start: 2022-10-18 | End: 2022-10-18

## 2022-10-18 NOTE — ED ADULT TRIAGE NOTE - CHIEF COMPLAINT QUOTE
woke up with sore throat yesterday. Tested negative with strep and covid yesterday. + chills and bodyaches. diff swallowing today. + hoarse voice

## 2022-10-19 ENCOUNTER — TRANSCRIPTION ENCOUNTER (OUTPATIENT)
Age: 37
End: 2022-10-19

## 2022-10-19 ENCOUNTER — APPOINTMENT (OUTPATIENT)
Dept: INTERNAL MEDICINE | Facility: CLINIC | Age: 37
End: 2022-10-19

## 2022-10-19 ENCOUNTER — NON-APPOINTMENT (OUTPATIENT)
Age: 37
End: 2022-10-19

## 2022-10-19 VITALS
OXYGEN SATURATION: 96 % | HEART RATE: 90 BPM | HEIGHT: 63 IN | DIASTOLIC BLOOD PRESSURE: 72 MMHG | TEMPERATURE: 97.8 F | WEIGHT: 236 LBS | BODY MASS INDEX: 41.82 KG/M2 | SYSTOLIC BLOOD PRESSURE: 132 MMHG

## 2022-10-19 VITALS
HEART RATE: 98 BPM | TEMPERATURE: 98 F | SYSTOLIC BLOOD PRESSURE: 120 MMHG | OXYGEN SATURATION: 97 % | DIASTOLIC BLOOD PRESSURE: 79 MMHG | RESPIRATION RATE: 20 BRPM

## 2022-10-19 DIAGNOSIS — J06.9 ACUTE UPPER RESPIRATORY INFECTION, UNSPECIFIED: ICD-10-CM

## 2022-10-19 LAB
ALBUMIN SERPL ELPH-MCNC: 3.6 G/DL — SIGNIFICANT CHANGE UP (ref 3.3–5)
ALP SERPL-CCNC: 104 U/L — SIGNIFICANT CHANGE UP (ref 40–120)
ALT FLD-CCNC: 24 U/L — SIGNIFICANT CHANGE UP (ref 12–78)
ANION GAP SERPL CALC-SCNC: 7 MMOL/L — SIGNIFICANT CHANGE UP (ref 5–17)
AST SERPL-CCNC: 16 U/L — SIGNIFICANT CHANGE UP (ref 15–37)
BASOPHILS # BLD AUTO: 0.09 K/UL — SIGNIFICANT CHANGE UP (ref 0–0.2)
BASOPHILS NFR BLD AUTO: 0.5 % — SIGNIFICANT CHANGE UP (ref 0–2)
BILIRUB SERPL-MCNC: 0.2 MG/DL — SIGNIFICANT CHANGE UP (ref 0.2–1.2)
BUN SERPL-MCNC: 9 MG/DL — SIGNIFICANT CHANGE UP (ref 7–23)
CALCIUM SERPL-MCNC: 8.8 MG/DL — SIGNIFICANT CHANGE UP (ref 8.5–10.1)
CHLORIDE SERPL-SCNC: 108 MMOL/L — SIGNIFICANT CHANGE UP (ref 96–108)
CO2 SERPL-SCNC: 23 MMOL/L — SIGNIFICANT CHANGE UP (ref 22–31)
CREAT SERPL-MCNC: 0.63 MG/DL — SIGNIFICANT CHANGE UP (ref 0.5–1.3)
EGFR: 116 ML/MIN/1.73M2 — SIGNIFICANT CHANGE UP
EOSINOPHIL # BLD AUTO: 0.16 K/UL — SIGNIFICANT CHANGE UP (ref 0–0.5)
EOSINOPHIL NFR BLD AUTO: 0.9 % — SIGNIFICANT CHANGE UP (ref 0–6)
GLUCOSE SERPL-MCNC: 126 MG/DL — HIGH (ref 70–99)
HCG SERPL-ACNC: <1 MIU/ML — SIGNIFICANT CHANGE UP
HCT VFR BLD CALC: 36.7 % — SIGNIFICANT CHANGE UP (ref 34.5–45)
HGB BLD-MCNC: 11.6 G/DL — SIGNIFICANT CHANGE UP (ref 11.5–15.5)
IMM GRANULOCYTES NFR BLD AUTO: 0.3 % — SIGNIFICANT CHANGE UP (ref 0–0.9)
LYMPHOCYTES # BLD AUTO: 13 % — SIGNIFICANT CHANGE UP (ref 13–44)
LYMPHOCYTES # BLD AUTO: 2.19 K/UL — SIGNIFICANT CHANGE UP (ref 1–3.3)
MCHC RBC-ENTMCNC: 24.5 PG — LOW (ref 27–34)
MCHC RBC-ENTMCNC: 31.6 GM/DL — LOW (ref 32–36)
MCV RBC AUTO: 77.4 FL — LOW (ref 80–100)
MONOCYTES # BLD AUTO: 1.25 K/UL — HIGH (ref 0–0.9)
MONOCYTES NFR BLD AUTO: 7.4 % — SIGNIFICANT CHANGE UP (ref 2–14)
NEUTROPHILS # BLD AUTO: 13.14 K/UL — HIGH (ref 1.8–7.4)
NEUTROPHILS NFR BLD AUTO: 77.9 % — HIGH (ref 43–77)
PLATELET # BLD AUTO: 324 K/UL — SIGNIFICANT CHANGE UP (ref 150–400)
POTASSIUM SERPL-MCNC: 3.8 MMOL/L — SIGNIFICANT CHANGE UP (ref 3.5–5.3)
POTASSIUM SERPL-SCNC: 3.8 MMOL/L — SIGNIFICANT CHANGE UP (ref 3.5–5.3)
PROT SERPL-MCNC: 8.2 GM/DL — SIGNIFICANT CHANGE UP (ref 6–8.3)
RBC # BLD: 4.74 M/UL — SIGNIFICANT CHANGE UP (ref 3.8–5.2)
RBC # FLD: 15.6 % — HIGH (ref 10.3–14.5)
S PYO AG SPEC QL IA: NEGATIVE
SODIUM SERPL-SCNC: 138 MMOL/L — SIGNIFICANT CHANGE UP (ref 135–145)
WBC # BLD: 16.88 K/UL — HIGH (ref 3.8–10.5)
WBC # FLD AUTO: 16.88 K/UL — HIGH (ref 3.8–10.5)

## 2022-10-19 PROCEDURE — 87880 STREP A ASSAY W/OPTIC: CPT | Mod: QW

## 2022-10-19 PROCEDURE — 99214 OFFICE O/P EST MOD 30 MIN: CPT

## 2022-10-19 RX ORDER — METFORMIN ER 500 MG 500 MG/1
500 TABLET ORAL
Qty: 60 | Refills: 0 | Status: ACTIVE | COMMUNITY
Start: 2022-07-28

## 2022-10-19 RX ADMIN — Medication 125 MILLIGRAM(S): at 01:41

## 2022-10-19 RX ADMIN — SODIUM CHLORIDE 1000 MILLILITER(S): 9 INJECTION INTRAMUSCULAR; INTRAVENOUS; SUBCUTANEOUS at 00:40

## 2022-10-19 RX ADMIN — Medication 30 MILLILITER(S): at 01:41

## 2022-10-19 RX ADMIN — LIDOCAINE 10 MILLILITER(S): 4 CREAM TOPICAL at 01:42

## 2022-10-19 RX ADMIN — Medication 30 MILLIGRAM(S): at 01:41

## 2022-10-19 NOTE — HISTORY OF PRESENT ILLNESS
[FreeTextEntry8] : Pt agrees to telephonic visit. She is in her home and I am in my office . The visit is being conducted via audio only\par Pt c/o laryngitis ans seen in ED and urgent care yesterday. Had body aches and went to urgent care  had neg strept, and neg covid. Went work yesterday  but in PM felt much worse, her  throat was  extremely painful could not swallow and hurt to even breathe or swallow  saliva, went to ED.  Was given in ed viscous lidocaine, steroid  with only a liele help.   Still very sever throat  but better than last night and today can swallow liquids.   Was unable to eat or drink yesterday. Has had chills  and some sweats. Her white blood cell count was elevated.  Has only had a rapid strept ruben.  I have advised the pt to come in today as I wouldlike to see her in person

## 2022-10-19 NOTE — ED PROVIDER NOTE - NSICDXPASTMEDICALHX_GEN_ALL_CORE_FT
PAST MEDICAL HISTORY:  Asthma     Chiari malformation type I     Nonintractable migraine, unspecified migraine type     PCOS (polycystic ovarian syndrome)

## 2022-10-19 NOTE — ED ADULT NURSE NOTE - OBJECTIVE STATEMENT
Pt reports sore throat starting yesterday sudden onsent, got worse today. Pt reports negative covid and strep test yesterday. Pt denies chills. Pt reports chills, bodyaches, sore throat, difficulty swallowing, lost voice. Pt denies SOB, difficulty breathing, chest pain. No swelling noted in oral cavity, throat, tongue. No s/o distress.

## 2022-10-19 NOTE — ED ADULT NURSE NOTE - NSIMPLEMENTINTERV_GEN_ALL_ED
Implemented All Universal Safety Interventions:  [place] to call system. Call bell, personal items and telephone within reach. Instruct patient to call for assistance. Room bathroom lighting operational. Non-slip footwear when patient is off stretcher. Physically safe environment: no spills, clutter or unnecessary equipment. Stretcher in lowest position, wheels locked, appropriate side rails in place.

## 2022-10-19 NOTE — ED PROVIDER NOTE - PATIENT PORTAL LINK FT
You can access the FollowMyHealth Patient Portal offered by [facility] by registering at the following website: [email or web address]. By joining [employer] [facility]’s FollowMyHealth portal, you will also be able to view your health information using other applications (apps) compatible with our system.

## 2022-10-19 NOTE — ED PROVIDER NOTE - PROGRESS NOTE DETAILS
pt told of results.   states feels some improvement after meds and IVF.   agree with plan for d/c home and will f/u

## 2022-10-19 NOTE — ED PROVIDER NOTE - OBJECTIVE STATEMENT
37 y/o female in ED c/o sore throat, myalgia x 2 days.   pt states awoke yesterday morning with symptoms.   states took home COVID test that was negative.   pt states seen at  today and had negative COVID and strep.   pt states throughout the day today sore throat getting worse to point that it is difficult to swallow.   tolerating PO.   states has been taking everything without relief.

## 2022-10-19 NOTE — HISTORY OF PRESENT ILLNESS
[FreeTextEntry8] : Pt here with sever sore throat, body aches,  chills and sweats for 2 days. Had thought she was better by yesterday but then she  felt worse so she went home and then   felt like she could not  swallow or even breathe the sore throat was so severe  she went to ER. Was given toradol and  steroid and viscous lidocaine with some relief. No . Has had rapid strept neg  at [facility].     Feels like she can get some liquids in.

## 2022-10-20 LAB
RAPID RVP RESULT: NOT DETECTED
SARS-COV-2 RNA PNL RESP NAA+PROBE: NOT DETECTED

## 2022-10-21 ENCOUNTER — APPOINTMENT (OUTPATIENT)
Dept: INTERNAL MEDICINE | Facility: CLINIC | Age: 37
End: 2022-10-21

## 2022-10-21 ENCOUNTER — NON-APPOINTMENT (OUTPATIENT)
Age: 37
End: 2022-10-21

## 2022-10-21 VITALS
SYSTOLIC BLOOD PRESSURE: 110 MMHG | DIASTOLIC BLOOD PRESSURE: 76 MMHG | HEIGHT: 63 IN | TEMPERATURE: 98.6 F | BODY MASS INDEX: 41.82 KG/M2 | HEART RATE: 80 BPM | WEIGHT: 236 LBS | OXYGEN SATURATION: 99 %

## 2022-10-21 DIAGNOSIS — Z87.09 PERSONAL HISTORY OF OTHER DISEASES OF THE RESPIRATORY SYSTEM: ICD-10-CM

## 2022-10-21 DIAGNOSIS — R05.9 COUGH, UNSPECIFIED: ICD-10-CM

## 2022-10-21 DIAGNOSIS — J04.10 ACUTE TRACHEITIS W/OUT OBSTRUCTION: ICD-10-CM

## 2022-10-21 DIAGNOSIS — J02.9 ACUTE PHARYNGITIS, UNSPECIFIED: ICD-10-CM

## 2022-10-21 PROCEDURE — 94060 EVALUATION OF WHEEZING: CPT

## 2022-10-21 PROCEDURE — 99214 OFFICE O/P EST MOD 30 MIN: CPT | Mod: 25

## 2022-10-21 RX ORDER — METFORMIN HYDROCHLORIDE 1000 MG/1
1000 TABLET, FILM COATED, EXTENDED RELEASE ORAL DAILY
Qty: 30 | Refills: 3 | Status: DISCONTINUED | COMMUNITY
End: 2022-10-21

## 2022-10-21 RX ORDER — FLUTICASONE PROPIONATE AND SALMETEROL XINAFOATE 230; 21 UG/1; UG/1
230-21 AEROSOL, METERED RESPIRATORY (INHALATION) TWICE DAILY
Qty: 1 | Refills: 11 | Status: DISCONTINUED | COMMUNITY
Start: 2020-11-20 | End: 2022-10-21

## 2022-10-21 RX ORDER — ELETRIPTAN HYDROBROMIDE 40 MG/1
40 TABLET, FILM COATED ORAL
Qty: 30 | Refills: 0 | Status: DISCONTINUED | COMMUNITY
End: 2022-10-21

## 2022-10-21 RX ORDER — DIAPER,BRIEF,INFANT-TODD,DISP
240 (27 FE) EACH MISCELLANEOUS
Qty: 30 | Refills: 0 | Status: DISCONTINUED | COMMUNITY
Start: 2022-07-27 | End: 2022-10-21

## 2022-10-21 RX ORDER — GUAIFENESIN AND CODEINE PHOSPHATE 10; 100 MG/5ML; MG/5ML
100-10 SOLUTION ORAL
Qty: 50 | Refills: 0 | Status: DISCONTINUED | COMMUNITY
Start: 2022-10-19 | End: 2022-10-21

## 2022-10-21 RX ORDER — ALBUTEROL SULFATE 2.5 MG/3ML
(2.5 MG/3ML) SOLUTION RESPIRATORY (INHALATION)
Qty: 1 | Refills: 5 | Status: DISCONTINUED | COMMUNITY
Start: 2021-08-30 | End: 2022-10-21

## 2022-10-21 RX ORDER — ALBUTEROL SULFATE 90 UG/1
108 (90 BASE) INHALANT RESPIRATORY (INHALATION) EVERY 6 HOURS
Qty: 1 | Refills: 0 | Status: DISCONTINUED | COMMUNITY
Start: 2021-12-14 | End: 2022-10-21

## 2022-10-21 RX ORDER — ELETRIPTAN HYDROBROMIDE 40 MG/1
40 TABLET, FILM COATED ORAL
Refills: 0 | Status: ACTIVE | COMMUNITY

## 2022-10-21 NOTE — HISTORY OF PRESENT ILLNESS
[FreeTextEntry8] : The patient states, that she was in her usual state of health, until 10/17/2022, when she awakened, with a sore throat, myalgias, and loss of her voice.  She went to an urgent care center, where both COVID and strep evaluations were negative.  She did not go to work.  On 10/18, she did go to work, but as the day progressed, her sore throat worsened significantly.  Her myalgias also worsened.  She had difficulty swallowing.  She had pain in the middle of her neck.  She began to cough and she was producing thick yellow secretions.  She went to the emergency room at [facility], where she was treated with 1 dose of Solu-Medrol, Toradol, and viscous lidocaine.  Of note is the fact that her white blood cell count was 16,000.\par \par On 10/19, she saw her primary care physician, Dr. Chen.  An RVP panel, was negative.  She was given a Medrol dose pack.\par \par The patient's symptoms have persisted.  She has not felt better throughout the course of the week.  She is still coughing.  She has no fever.  She has not yet started her Advair, which she usually does in November.  She now comes in for this assessment.

## 2022-10-21 NOTE — PLAN
[FreeTextEntry1] : 1.  We will now institute prednisone 40 mg daily for 6 days.  She will begin tonight.  She has been told to discontinue the Dosepak.\par \par 2.  Zithromax 500 mg daily for 7 days.  Of note is the fact that she had a leukocytosis\par \par 3.  Tessalon Perles 1-2 every 4-6 hours as needed, or promethazine with codeine 5 to 10 cc every 4-6 hours as needed for cough suppression.\par \par 4.  The patient will begin her Advair HFA, in several weeks, in preparation for the upcoming winter season.  This is as per her routine.\par \par 5.  The patient will follow-up by phone next week to assess her response to the above-noted interventions.  I have told her that she needs to follow-up her CBC with her primary care physician, Dr. Chen.

## 2022-10-21 NOTE — PHYSICAL EXAM
[No Acute Distress] : no acute distress [No JVD] : no jugular venous distention [Supple] : supple [No Accessory Muscle Use] : no accessory muscle use [Clear to Auscultation] : lungs were clear to auscultation bilaterally [Regular Rhythm] : with a regular rhythm [Normal S1, S2] : normal S1 and S2 [No Edema] : there was no peripheral edema [Soft] : abdomen soft [Normal Bowel Sounds] : normal bowel sounds [Normal Supraclavicular Nodes] : no supraclavicular lymphadenopathy [Normal Posterior Cervical Nodes] : no posterior cervical lymphadenopathy [Normal Anterior Cervical Nodes] : no anterior cervical lymphadenopathy [de-identified] : Pharynx is mildly erythematous.  Slight secretions are noted posteriorly along the posterior pharyngeal wall.  The tonsils are somewhat prominent without exudate. [de-identified] : There is tenderness on palpation over the mid neck region near her Yoni's apple.

## 2022-10-23 ENCOUNTER — TRANSCRIPTION ENCOUNTER (OUTPATIENT)
Age: 37
End: 2022-10-23

## 2022-10-23 LAB — BACTERIA THROAT CULT: NORMAL

## 2022-12-21 ENCOUNTER — RESULT CHARGE (OUTPATIENT)
Age: 37
End: 2022-12-21

## 2022-12-22 ENCOUNTER — NON-APPOINTMENT (OUTPATIENT)
Age: 37
End: 2022-12-22

## 2022-12-22 ENCOUNTER — APPOINTMENT (OUTPATIENT)
Dept: INTERNAL MEDICINE | Facility: CLINIC | Age: 37
End: 2022-12-22

## 2022-12-22 VITALS
BODY MASS INDEX: 42.88 KG/M2 | OXYGEN SATURATION: 98 % | SYSTOLIC BLOOD PRESSURE: 102 MMHG | HEART RATE: 95 BPM | HEIGHT: 63 IN | DIASTOLIC BLOOD PRESSURE: 76 MMHG | WEIGHT: 242 LBS | TEMPERATURE: 97.7 F

## 2022-12-22 DIAGNOSIS — J45.909 UNSPECIFIED ASTHMA, UNCOMPLICATED: ICD-10-CM

## 2022-12-22 PROCEDURE — 95012 NITRIC OXIDE EXP GAS DETER: CPT

## 2022-12-22 PROCEDURE — 99214 OFFICE O/P EST MOD 30 MIN: CPT | Mod: 25

## 2022-12-22 PROCEDURE — 94060 EVALUATION OF WHEEZING: CPT

## 2022-12-22 RX ORDER — METHYLPREDNISOLONE 4 MG/1
4 TABLET ORAL
Qty: 1 | Refills: 0 | Status: DISCONTINUED | COMMUNITY
Start: 2022-10-19 | End: 2022-12-22

## 2022-12-22 RX ORDER — BENZONATATE 100 MG/1
100 CAPSULE ORAL 4 TIMES DAILY
Qty: 80 | Refills: 11 | Status: DISCONTINUED | COMMUNITY
Start: 2022-10-21 | End: 2022-12-22

## 2022-12-22 RX ORDER — PREDNISONE 20 MG/1
20 TABLET ORAL
Qty: 12 | Refills: 0 | Status: DISCONTINUED | COMMUNITY
Start: 2022-10-21 | End: 2022-12-22

## 2022-12-22 RX ORDER — AZITHROMYCIN 500 MG/1
500 TABLET, FILM COATED ORAL DAILY
Qty: 7 | Refills: 0 | Status: DISCONTINUED | COMMUNITY
Start: 2022-10-21 | End: 2022-12-22

## 2022-12-22 NOTE — DATA REVIEWED
[FreeTextEntry1] : Biometric analysis is within normal limits.  Slight bronchodilator reactivity is demonstrated.\par \par The fractional excretion of nitric oxide is normal at 9.

## 2022-12-22 NOTE — PHYSICAL EXAM
[Normal Oropharynx] : the oropharynx was normal [Supple] : supple [No Respiratory Distress] : no respiratory distress  [No Accessory Muscle Use] : no accessory muscle use [Clear to Auscultation] : lungs were clear to auscultation bilaterally [Regular Rhythm] : with a regular rhythm [Normal S1, S2] : normal S1 and S2 [No Extremity Clubbing/Cyanosis] : no extremity clubbing/cyanosis [Soft] : abdomen soft [Normal Bowel Sounds] : normal bowel sounds [Normal Posterior Cervical Nodes] : no posterior cervical lymphadenopathy [Normal Anterior Cervical Nodes] : no anterior cervical lymphadenopathy [de-identified] : obese white female [de-identified] : there is fair to good air entry bilaterally.

## 2022-12-22 NOTE — HISTORY OF PRESENT ILLNESS
[FreeTextEntry1] : The patient comes in today for a routine pulmonary followup evaluation.\par  [de-identified] : The patient states, from a pulmonary standpoint, she is doing well at this time.  She was last seen by myself, back in October, when she had significant persistent sore throat, which was followed by a protracted cough.  She eventually had her symptoms improved after she was given a course of prednisone.  Of note is the fact that the patient was not on her inhaled corticosteroids at that time.  She usually starts them in the early winter and takes it throughout the entire winter season.  The winter is usually the worst time of the year for her.\par \par At this time, the patient states that she is doing well.  She denies any wheezing or sputum production.  She remains active, but does not perform any formal type of exercise.  She has not been able to lose weight.  She does occasionally have postnasal drip.\par \par The patient is not on any treatment for obstructive sleep apnea.  She never obtained the AutoPap device.  She notes that her energy levels are good.  She now comes in for this assessment.

## 2022-12-22 NOTE — PLAN
[FreeTextEntry1] : 1.  Continue with medical regimen as outlined above.\par \par 2.  The patient has been told that she needs to institute her maintenance inhaler, namely the Advair HFA at this time.  She usually has difficulty throughout the winter months.  She will discontinue it in the spring.\par \par 3.  Follow-up in 6 months with full pulmonary function testing.\par \par 4.  Routine medical follow-up with the primary care physician, Dr. Chen.

## 2023-01-17 ENCOUNTER — NON-APPOINTMENT (OUTPATIENT)
Age: 38
End: 2023-01-17

## 2023-01-18 ENCOUNTER — APPOINTMENT (OUTPATIENT)
Dept: INTERNAL MEDICINE | Facility: CLINIC | Age: 38
End: 2023-01-18
Payer: COMMERCIAL

## 2023-01-18 DIAGNOSIS — U07.1 COVID-19: ICD-10-CM

## 2023-01-18 PROCEDURE — 99443: CPT

## 2023-01-18 NOTE — ASSESSMENT
[FreeTextEntry1] : #1  COVID infection.  The patient tested positive today.  She does have moderate persistent asthma and is noting an intermittent wheeze.  She will take a Medrol Dosepak.  Continue Advair 230 strength inhaler and p.o. montelukast.  Continue albuterol as needed for rescue.  Patient will ensure hydration, take Tylenol as needed, isolate as per guidelines.  The patient is going to [facility] urgent care for a confirmatory COVID test PCR.  She will discuss Paxlovid therapy (would hold eletriptan, monitor FS glucoses) versus possible remdesivir therapy (which she is asking about) while she is being seen there.\par \par Total time of telephone visit today is 24 minutes.\par \par Addendum: Patient was seen in urgent care and treated with Paxlovid.

## 2023-01-18 NOTE — HISTORY OF PRESENT ILLNESS
[FreeTextEntry8] : This is a telephone visit for this 37-year-old female with a history of moderate persistent asthma.  She developed sore throat 2 days ago and postnasal drip yesterday and now has cough, aches, tiredness.  She is not having fever.  She is not now short of breath.  She only has an intermittent wheeze and has not needed to use her rescue albuterol inhaler.  She is maintained on advair 230 strength 2 puffs twice daily and montelukast p.o.\par  \par She tested positive for COVID at home today.  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

## 2023-04-27 ENCOUNTER — APPOINTMENT (OUTPATIENT)
Dept: FAMILY MEDICINE | Facility: CLINIC | Age: 38
End: 2023-04-27
Payer: COMMERCIAL

## 2023-04-27 VITALS
WEIGHT: 250 LBS | BODY MASS INDEX: 44.3 KG/M2 | OXYGEN SATURATION: 98 % | HEIGHT: 63 IN | HEART RATE: 89 BPM | DIASTOLIC BLOOD PRESSURE: 80 MMHG | SYSTOLIC BLOOD PRESSURE: 106 MMHG | TEMPERATURE: 98 F

## 2023-04-27 DIAGNOSIS — R73.09 OTHER ABNORMAL GLUCOSE: ICD-10-CM

## 2023-04-27 DIAGNOSIS — E66.01 MORBID (SEVERE) OBESITY DUE TO EXCESS CALORIES: ICD-10-CM

## 2023-04-27 DIAGNOSIS — G43.909 MIGRAINE, UNSPECIFIED, NOT INTRACTABLE, W/OUT STATUS MIGRAINOSUS: ICD-10-CM

## 2023-04-27 PROCEDURE — 99213 OFFICE O/P EST LOW 20 MIN: CPT | Mod: 25

## 2023-04-27 PROCEDURE — 36415 COLL VENOUS BLD VENIPUNCTURE: CPT

## 2023-04-27 NOTE — HISTORY OF PRESENT ILLNESS
[FreeTextEntry8] : BE MATHIS is a 37 year old female presenting for forms to be completed for new job.

## 2023-04-27 NOTE — REVIEW OF SYSTEMS
----- Message from TABBY Ku sent at 12/22/2022  9:26 AM CST -----  C diff is positive. Recommend repeating Vancomycin pulse taper again. Call with an update in a few weeks. Rx sent to the pharmacy   [Negative] : Respiratory

## 2023-04-27 NOTE — ASSESSMENT
[FreeTextEntry1] : Patient was seen by me for the first time. I reviewed her medical history including medications. \par Currently in hiring process. CBC and Quant Gold requested, will be done today.

## 2023-04-28 ENCOUNTER — TRANSCRIPTION ENCOUNTER (OUTPATIENT)
Age: 38
End: 2023-04-28

## 2023-04-28 LAB
BASOPHILS # BLD AUTO: 0.12 K/UL
BASOPHILS NFR BLD AUTO: 1 %
EOSINOPHIL # BLD AUTO: 0.33 K/UL
EOSINOPHIL NFR BLD AUTO: 2.8 %
HCT VFR BLD CALC: 37.4 %
HGB BLD-MCNC: 11.5 G/DL
IMM GRANULOCYTES NFR BLD AUTO: 0.4 %
LYMPHOCYTES # BLD AUTO: 4.23 K/UL
LYMPHOCYTES NFR BLD AUTO: 35.8 %
MAN DIFF?: NORMAL
MCHC RBC-ENTMCNC: 24.2 PG
MCHC RBC-ENTMCNC: 30.7 GM/DL
MCV RBC AUTO: 78.6 FL
MONOCYTES # BLD AUTO: 1.09 K/UL
MONOCYTES NFR BLD AUTO: 9.2 %
NEUTROPHILS # BLD AUTO: 6.01 K/UL
NEUTROPHILS NFR BLD AUTO: 50.8 %
PLATELET # BLD AUTO: 406 K/UL
RBC # BLD: 4.76 M/UL
RBC # FLD: 16.7 %
WBC # FLD AUTO: 11.83 K/UL

## 2023-05-04 LAB
M TB IFN-G BLD-IMP: NEGATIVE
QUANTIFERON TB PLUS MITOGEN MINUS NIL: >10 IU/ML
QUANTIFERON TB PLUS NIL: 0.01 IU/ML
QUANTIFERON TB PLUS TB1 MINUS NIL: 0.01 IU/ML
QUANTIFERON TB PLUS TB2 MINUS NIL: 0 IU/ML

## 2023-05-05 ENCOUNTER — TRANSCRIPTION ENCOUNTER (OUTPATIENT)
Age: 38
End: 2023-05-05

## 2023-06-17 ENCOUNTER — NON-APPOINTMENT (OUTPATIENT)
Age: 38
End: 2023-06-17

## 2023-07-14 ENCOUNTER — APPOINTMENT (OUTPATIENT)
Dept: INTERNAL MEDICINE | Facility: CLINIC | Age: 38
End: 2023-07-14
Payer: COMMERCIAL

## 2023-07-14 VITALS
DIASTOLIC BLOOD PRESSURE: 72 MMHG | OXYGEN SATURATION: 99 % | WEIGHT: 247 LBS | HEIGHT: 63 IN | TEMPERATURE: 98.1 F | SYSTOLIC BLOOD PRESSURE: 112 MMHG | BODY MASS INDEX: 43.77 KG/M2 | HEART RATE: 70 BPM | RESPIRATION RATE: 16 BRPM

## 2023-07-14 PROCEDURE — ZZZZZ: CPT

## 2023-07-14 PROCEDURE — 99215 OFFICE O/P EST HI 40 MIN: CPT | Mod: 25

## 2023-07-14 PROCEDURE — 94729 DIFFUSING CAPACITY: CPT

## 2023-07-14 PROCEDURE — 94060 EVALUATION OF WHEEZING: CPT

## 2023-07-14 PROCEDURE — 94727 GAS DIL/WSHOT DETER LNG VOL: CPT

## 2023-07-14 RX ORDER — METHYLPREDNISOLONE 4 MG/1
4 TABLET ORAL
Qty: 1 | Refills: 0 | Status: DISCONTINUED | COMMUNITY
Start: 2023-01-18 | End: 2023-07-14

## 2023-07-14 NOTE — ADDENDUM
[FreeTextEntry1] : I, Ephraim Buchanan, documented this note as a scribe on behalf of Dr. Roque Smith MD on 07/14/2023.

## 2023-07-14 NOTE — DATA REVIEWED
[FreeTextEntry1] : Pulmonary function test was performed.  Lung volumes are within normal limits except for a moderate decrease in the FRC.  Lung mechanics are within normal limits.  Mild bronchodilator reactivity is demonstrated.  The DLCO and saturation are maintained.  This represents a mild degree of restriction.  The restriction is due to her centripetal obesity.  Airway reactivity is noted.

## 2023-07-14 NOTE — PLAN
[FreeTextEntry1] : 1. Continue current medications as previously outlined. \par \par 2. Patient will discontinue her Advair in the next week or so if she remains asymptomatic.  She will then begin using Advair again September 1st and d/c'ing it in the Spring. \par \par 3. Follow-up  in 6 months for office visit, and prepost spirometry with FeNO .\par \par 4.  The patient will continue to follow-up with her primary care physician, Dr. Chen.

## 2023-07-14 NOTE — PHYSICAL EXAM
[No Rash] : no rash [Normal Gait] : normal gait [Coordination Grossly Intact] : coordination grossly intact [Normal Affect] : the affect was normal [Normal Insight/Judgement] : insight and judgment were intact [General Appearance - Alert] : alert [General Appearance - In No Acute Distress] : in no acute distress [Sclera] : the sclera and conjunctiva were normal [PERRL With Normal Accommodation] : pupils were equal in size, round, and reactive to light [Extraocular Movements] : extraocular movements were intact [Neck Appearance] : the appearance of the neck was normal [Neck Cervical Mass (___cm)] : no neck mass was observed [Jugular Venous Distention Increased] : there was no jugular-venous distention [] : no respiratory distress [Auscultation Breath Sounds / Voice Sounds] : lungs were clear to auscultation bilaterally [Heart Sounds] : normal S1 and S2 [Heart Rate And Rhythm] : heart rate was normal and rhythm regular [Heart Sounds Gallop] : no gallops [Heart Sounds Pericardial Friction Rub] : no pericardial rub [Arterial Pulses Carotid] : carotid pulses were normal with no bruits [Edema] : there was no peripheral edema [Cervical Lymph Nodes Enlarged Posterior Bilaterally] : posterior cervical [Cervical Lymph Nodes Enlarged Anterior Bilaterally] : anterior cervical [Supraclavicular Lymph Nodes Enlarged Bilaterally] : supraclavicular [No CVA Tenderness] : no ~M costovertebral angle tenderness [No Spinal Tenderness] : no spinal tenderness [Nail Clubbing] : no clubbing  or cyanosis of the fingernails [FreeTextEntry1] : There is a 1/6 systolic ejection murmur at the left sternal border

## 2023-07-14 NOTE — HISTORY OF PRESENT ILLNESS
[FreeTextEntry1] : The patient comes in for a yearly pulmonary evaluation.  [de-identified] : The patient feels relatively well at this time. She has moderate persistent asthma and is maintained on Advair 230-21 mcg/act taking 2 puffs BID (between September and April) and montelukast 10 mg qd. She denies any cough, wheeze or SOB. \par \par She was in her usual state of health until approximately 2 weeks ago when she developed post nasal drip and chest congestion. She also developed a cough which produced thick yellow/green sputum along with SOB. She treated herself with prednisone 30 mg x5 days, 20 mg x3 days, 10 mg x3 days then discontinued. She also took levaquin. Of note is that she takes Claritin daily. Her symptoms have since resolved, although she does still note a mild nonproductive cough. \par \par She had COVID in January of this year. She had a televisit with Dr. Sotelo and was prescribed a Medrol dose marla. She went to urgent care and was prescribed paxlovid which she did not take. Her symptoms resolved on their own. \par \par The patient denies any other constitutional symptoms at this time. She now comes in for this assessment.

## 2023-07-28 ENCOUNTER — TRANSCRIPTION ENCOUNTER (OUTPATIENT)
Age: 38
End: 2023-07-28

## 2023-09-15 RX ORDER — FLUTICASONE PROPIONATE AND SALMETEROL XINAFOATE 230; 21 UG/1; UG/1
230-21 AEROSOL, METERED RESPIRATORY (INHALATION)
Qty: 1 | Refills: 11 | Status: ACTIVE | COMMUNITY
Start: 2022-10-21 | End: 1900-01-01

## 2023-11-09 ENCOUNTER — NON-APPOINTMENT (OUTPATIENT)
Age: 38
End: 2023-11-09

## 2023-11-20 NOTE — ED ADULT NURSE NOTE - EXTENSIONS OF SELF_ADULT
Subjective: Pt seen and examined, reports feeling well, denies any complaints. He in remains in atrial flutter ~130bpm. Pt now requiring increasing supplemental oxygen requirements. Left sided chest tube placed on 11/18; CXR with increasing left chest opacification- CT chest ordered.     TELE: AFL ~130bpm     MEDICATIONS  (STANDING):  aspirin  chewable 81 milliGRAM(s) Oral daily  atorvastatin 20 milliGRAM(s) Oral at bedtime  furosemide   Injectable 40 milliGRAM(s) IV Push every 12 hours  heparin  Infusion.  Unit(s)/Hr (23 mL/Hr) IV Continuous <Continuous>  influenza  Vaccine (HIGH DOSE) 0.7 milliLiter(s) IntraMuscular once  metolazone 5 milliGRAM(s) Oral daily  metoprolol tartrate 50 milliGRAM(s) Oral every 6 hours  potassium chloride    Tablet ER 40 milliEquivalent(s) Oral every 4 hours  spironolactone 25 milliGRAM(s) Oral daily    MEDICATIONS  (PRN):  acetaminophen     Tablet .. 650 milliGRAM(s) Oral every 6 hours PRN Temp greater or equal to 38C (100.4F), Mild Pain (1 - 3)  aluminum hydroxide/magnesium hydroxide/simethicone Suspension 30 milliLiter(s) Oral every 4 hours PRN Dyspepsia  heparin   Injectable 73551 Unit(s) IV Push every 6 hours PRN For aPTT less than 40  heparin   Injectable 5000 Unit(s) IV Push every 6 hours PRN For aPTT between 40 - 57  melatonin 3 milliGRAM(s) Oral at bedtime PRN Insomnia  metoprolol tartrate Injectable 5 milliGRAM(s) IV Push every 6 hours PRN RVR > 130  ondansetron Injectable 4 milliGRAM(s) IV Push every 8 hours PRN Nausea and/or Vomiting      Allergies  No Known Allergies      Vital Signs Last 24 Hrs  T(C): 36.5 (20 Nov 2023 08:13), Max: 36.9 (20 Nov 2023 00:10)  T(F): 97.7 (20 Nov 2023 08:13), Max: 98.4 (20 Nov 2023 00:10)  HR: 132 (20 Nov 2023 09:11) (127 - 137)  BP: 114/70 (20 Nov 2023 09:11) (90/60 - 121/74)  BP(mean): 96 (20 Nov 2023 06:00) (65 - 96)  RR: 32 (20 Nov 2023 09:11) (17 - 33)  SpO2: 96% (20 Nov 2023 09:11) (94% - 100%)    Parameters below as of 20 Nov 2023 09:11  Patient On (Oxygen Delivery Method): nasal cannula  O2 Flow (L/min): 5      Physical Exam:  Constitutional: NAD, AAOx3, on supplemental O2  Cardiovascular: Regular, tachycardic   Pulmonary: Limited BS on left   GI: obese, soft NTND   Extremities: 1+ LE edema   Neuro: non focal, LEE x4    LABS:                        13.7   13.18 )-----------( 254      ( 20 Nov 2023 00:57 )             43.7     11-20    139  |  101  |  60.1<H>  ----------------------------<  143<H>  3.4<L>   |  24.0  |  1.80<H>    Ca    8.6      20 Nov 2023 00:57  Mg     1.9     11-20    TPro  6.3<L>  /  Alb  2.9<L>  /  TBili  1.1  /  DBili  x   /  AST  30  /  ALT  22  /  AlkPhos  81  11-19    PTT - ( 20 Nov 2023 07:17 )  PTT:82.6 sec  Urinalysis Basic - ( 20 Nov 2023 00:57 )    Color: x / Appearance: x / SG: x / pH: x  Gluc: 143 mg/dL / Ketone: x  / Bili: x / Urobili: x   Blood: x / Protein: x / Nitrite: x   Leuk Esterase: x / RBC: x / WBC x   Sq Epi: x / Non Sq Epi: x / Bacteria: x        RADIOLOGY & ADDITIONAL TESTS:  TTE 11/18/2023  Summary:   1. There is mild concentric left ventricular hypertrophy.   2. Moderate to severely increased left ventricular internal cavity size.   3. Left ventricular ejection fraction, by visual estimation, is 20 to   25%.   4. Severely decreased global left ventricular systolic function.   5. The mitral in-flow pattern reveals no discernable A-wave, therefore   no comment on diastolic function can be made.   6. Mildly enlarged right ventricle.   7. Severely reduced RV systolic function.   8. Mildly enlarged left atrium.   9. Normal right atrial size.  10. Sclerotic aortic valve with normal opening.  11. Mild thickening and calcification of the anterior and posterior   mitral valve leaflets.  12. Mild mitral annular calcification.  13. Mild mitral valve regurgitation.  14. The main pulmonary artery is normal in size.  15. There is no evidence of pericardial effusion.    ACC: [id number] EXAM:  CT CHEST   ORDERED BY: JORGE GORDON     PROCEDURE DATE:  11/18/2023          INTERPRETATION:  Clinical information: Shortness of breath.    CT scan of the chest was obtained without administration of intravenous   contrast.    Few small lymph nodes are present in the mediastinum.    Heart is enlarged in size. Patient is status post CABG. No pericardial   effusion is noted.    No endobronchial lesions are noted. Marked compressive atelectasis of the   left lung is noted. This is secondary to moderate to large loculated left   pleural effusion. Right lung is essentially clear.    Below the diaphragm, visualized portions of the abdomen demonstrate   thickening of both adrenal glands. Patient is status post   cholecystectomy. Low-attenuation lesion in the right kidney is   incompletely imaged on this exam.    Degenerative changes of the spine are noted.     Subjective: Pt seen and examined, reports feeling well, denies any complaints. He remains in atrial flutter ~130bpm. Pt now requiring increasing supplemental oxygen requirements. Left sided chest tube placed on 11/18; CXR with increasing left chest opacification- CT chest ordered.     TELE: AFL ~130bpm     MEDICATIONS  (STANDING):  aspirin  chewable 81 milliGRAM(s) Oral daily  atorvastatin 20 milliGRAM(s) Oral at bedtime  furosemide   Injectable 40 milliGRAM(s) IV Push every 12 hours  heparin  Infusion.  Unit(s)/Hr (23 mL/Hr) IV Continuous <Continuous>  influenza  Vaccine (HIGH DOSE) 0.7 milliLiter(s) IntraMuscular once  metolazone 5 milliGRAM(s) Oral daily  metoprolol tartrate 50 milliGRAM(s) Oral every 6 hours  potassium chloride    Tablet ER 40 milliEquivalent(s) Oral every 4 hours  spironolactone 25 milliGRAM(s) Oral daily    MEDICATIONS  (PRN):  acetaminophen     Tablet .. 650 milliGRAM(s) Oral every 6 hours PRN Temp greater or equal to 38C (100.4F), Mild Pain (1 - 3)  aluminum hydroxide/magnesium hydroxide/simethicone Suspension 30 milliLiter(s) Oral every 4 hours PRN Dyspepsia  heparin   Injectable 92947 Unit(s) IV Push every 6 hours PRN For aPTT less than 40  heparin   Injectable 5000 Unit(s) IV Push every 6 hours PRN For aPTT between 40 - 57  melatonin 3 milliGRAM(s) Oral at bedtime PRN Insomnia  metoprolol tartrate Injectable 5 milliGRAM(s) IV Push every 6 hours PRN RVR > 130  ondansetron Injectable 4 milliGRAM(s) IV Push every 8 hours PRN Nausea and/or Vomiting      Allergies  No Known Allergies      Vital Signs Last 24 Hrs  T(C): 36.5 (20 Nov 2023 08:13), Max: 36.9 (20 Nov 2023 00:10)  T(F): 97.7 (20 Nov 2023 08:13), Max: 98.4 (20 Nov 2023 00:10)  HR: 132 (20 Nov 2023 09:11) (127 - 137)  BP: 114/70 (20 Nov 2023 09:11) (90/60 - 121/74)  BP(mean): 96 (20 Nov 2023 06:00) (65 - 96)  RR: 32 (20 Nov 2023 09:11) (17 - 33)  SpO2: 96% (20 Nov 2023 09:11) (94% - 100%)    Parameters below as of 20 Nov 2023 09:11  Patient On (Oxygen Delivery Method): nasal cannula  O2 Flow (L/min): 5      Physical Exam:  Constitutional: NAD, AAOx3, on supplemental O2  Cardiovascular: Regular, tachycardic   Pulmonary: Limited BS on left   GI: obese, soft NTND   Extremities: 1+ LE edema   Neuro: non focal, LEE x4    LABS:                        13.7   13.18 )-----------( 254      ( 20 Nov 2023 00:57 )             43.7     11-20    139  |  101  |  60.1<H>  ----------------------------<  143<H>  3.4<L>   |  24.0  |  1.80<H>    Ca    8.6      20 Nov 2023 00:57  Mg     1.9     11-20    TPro  6.3<L>  /  Alb  2.9<L>  /  TBili  1.1  /  DBili  x   /  AST  30  /  ALT  22  /  AlkPhos  81  11-19    PTT - ( 20 Nov 2023 07:17 )  PTT:82.6 sec  Urinalysis Basic - ( 20 Nov 2023 00:57 )    Color: x / Appearance: x / SG: x / pH: x  Gluc: 143 mg/dL / Ketone: x  / Bili: x / Urobili: x   Blood: x / Protein: x / Nitrite: x   Leuk Esterase: x / RBC: x / WBC x   Sq Epi: x / Non Sq Epi: x / Bacteria: x        RADIOLOGY & ADDITIONAL TESTS:  TTE 11/18/2023  Summary:   1. There is mild concentric left ventricular hypertrophy.   2. Moderate to severely increased left ventricular internal cavity size.   3. Left ventricular ejection fraction, by visual estimation, is 20 to   25%.   4. Severely decreased global left ventricular systolic function.   5. The mitral in-flow pattern reveals no discernable A-wave, therefore   no comment on diastolic function can be made.   6. Mildly enlarged right ventricle.   7. Severely reduced RV systolic function.   8. Mildly enlarged left atrium.   9. Normal right atrial size.  10. Sclerotic aortic valve with normal opening.  11. Mild thickening and calcification of the anterior and posterior   mitral valve leaflets.  12. Mild mitral annular calcification.  13. Mild mitral valve regurgitation.  14. The main pulmonary artery is normal in size.  15. There is no evidence of pericardial effusion.    ACC: [id number] EXAM:  CT CHEST   ORDERED BY: JORGE GORDON     PROCEDURE DATE:  11/18/2023          INTERPRETATION:  Clinical information: Shortness of breath.    CT scan of the chest was obtained without administration of intravenous   contrast.    Few small lymph nodes are present in the mediastinum.    Heart is enlarged in size. Patient is status post CABG. No pericardial   effusion is noted.    No endobronchial lesions are noted. Marked compressive atelectasis of the   left lung is noted. This is secondary to moderate to large loculated left   pleural effusion. Right lung is essentially clear.    Below the diaphragm, visualized portions of the abdomen demonstrate   thickening of both adrenal glands. Patient is status post   cholecystectomy. Low-attenuation lesion in the right kidney is   incompletely imaged on this exam.    Degenerative changes of the spine are noted.     Eyeglasses

## 2024-01-08 ENCOUNTER — RX RENEWAL (OUTPATIENT)
Age: 39
End: 2024-01-08

## 2024-01-08 RX ORDER — MONTELUKAST 10 MG/1
10 TABLET, FILM COATED ORAL
Qty: 90 | Refills: 3 | Status: ACTIVE | COMMUNITY
Start: 2017-04-03 | End: 1900-01-01

## 2024-01-27 ENCOUNTER — NON-APPOINTMENT (OUTPATIENT)
Age: 39
End: 2024-01-27

## 2024-02-08 ENCOUNTER — RESULT CHARGE (OUTPATIENT)
Age: 39
End: 2024-02-08

## 2024-02-09 ENCOUNTER — APPOINTMENT (OUTPATIENT)
Dept: INTERNAL MEDICINE | Facility: CLINIC | Age: 39
End: 2024-02-09
Payer: COMMERCIAL

## 2024-02-09 ENCOUNTER — NON-APPOINTMENT (OUTPATIENT)
Age: 39
End: 2024-02-09

## 2024-02-09 VITALS
TEMPERATURE: 98.4 F | HEART RATE: 97 BPM | OXYGEN SATURATION: 97 % | WEIGHT: 260 LBS | RESPIRATION RATE: 16 BRPM | HEIGHT: 63 IN | BODY MASS INDEX: 46.07 KG/M2 | DIASTOLIC BLOOD PRESSURE: 60 MMHG | SYSTOLIC BLOOD PRESSURE: 110 MMHG

## 2024-02-09 DIAGNOSIS — K21.9 GASTRO-ESOPHAGEAL REFLUX DISEASE W/OUT ESOPHAGITIS: ICD-10-CM

## 2024-02-09 DIAGNOSIS — J30.9 ALLERGIC RHINITIS, UNSPECIFIED: ICD-10-CM

## 2024-02-09 DIAGNOSIS — J32.9 CHRONIC SINUSITIS, UNSPECIFIED: ICD-10-CM

## 2024-02-09 DIAGNOSIS — J98.4 OTHER DISORDERS OF LUNG: ICD-10-CM

## 2024-02-09 DIAGNOSIS — G47.33 OBSTRUCTIVE SLEEP APNEA (ADULT) (PEDIATRIC): ICD-10-CM

## 2024-02-09 DIAGNOSIS — J45.40 MODERATE PERSISTENT ASTHMA, UNCOMPLICATED: ICD-10-CM

## 2024-02-09 DIAGNOSIS — Z23 ENCOUNTER FOR IMMUNIZATION: ICD-10-CM

## 2024-02-09 PROCEDURE — 94060 EVALUATION OF WHEEZING: CPT

## 2024-02-09 PROCEDURE — 95012 NITRIC OXIDE EXP GAS DETER: CPT

## 2024-02-09 PROCEDURE — 99214 OFFICE O/P EST MOD 30 MIN: CPT | Mod: 25

## 2024-02-09 RX ORDER — GUAIFENESIN AND CODEINE PHOSPHATE 10; 100 MG/5ML; MG/5ML
100-10 SOLUTION ORAL
Qty: 237 | Refills: 0 | Status: DISCONTINUED | COMMUNITY
Start: 2023-11-10 | End: 2024-02-09

## 2024-02-09 RX ORDER — PROMETHAZINE HYDROCHLORIDE AND CODEINE PHOSPHATE 6.25; 1 MG/5ML; MG/5ML
6.25-1 SOLUTION ORAL
Qty: 240 | Refills: 0 | Status: DISCONTINUED | COMMUNITY
Start: 2022-10-21 | End: 2024-02-09

## 2024-02-09 RX ORDER — SELENIUM 50 MCG
TABLET ORAL
Refills: 0 | Status: DISCONTINUED | COMMUNITY
End: 2024-02-09

## 2024-02-09 NOTE — HISTORY OF PRESENT ILLNESS
[FreeTextEntry1] : The patient comes in today for a routine follow-up pulmonary assessment. [de-identified] : The patient states that, from a pulmonary standpoint, she is doing well at this time. She has a history of moderate persistent asthma and continues on Advair, 2 puffs twice daily, and Singulair 10 mg once daily. She reports several exacerbations of the asthma this winter. She treated herself with steroid- tapered doses, on several occasions and her symptoms eventually improved. She did also use her ProAir inhaler with improvement in her symptoms. At this time, the patient denies any cough, mucus production or wheeze.   The patient had a sinus infection about one week ago. She does have chronic sinusitis with post-nasal drip, and notes that about one week ago, the taste of the secretion had changed. Therefore, she treated herself with doxycycline and her symptoms improved within 24 hours. She is now back to her baseline. There have been no fevers, chills, or night sweats.  The patient does have a history of obstructive sleep apnea, but she is unable to tolerate CPAP.  It therefore goes untreated.  There have been no other acute constitutional symptoms. She comes in for this assessment.

## 2024-02-09 NOTE — PHYSICAL EXAM
[Normal Oropharynx] : the oropharynx was normal [Supple] : supple [No Respiratory Distress] : no respiratory distress  [No Accessory Muscle Use] : no accessory muscle use [Clear to Auscultation] : lungs were clear to auscultation bilaterally [Regular Rhythm] : with a regular rhythm [Normal S1, S2] : normal S1 and S2 [No Extremity Clubbing/Cyanosis] : no extremity clubbing/cyanosis [Soft] : abdomen soft [Normal Bowel Sounds] : normal bowel sounds [Normal Posterior Cervical Nodes] : no posterior cervical lymphadenopathy [Normal Anterior Cervical Nodes] : no anterior cervical lymphadenopathy [Normal Affect] : the affect was normal [Normal Insight/Judgement] : insight and judgment were intact [de-identified] : obese white female [de-identified] : there is fair to good air entry bilaterally.

## 2024-02-09 NOTE — PLAN
[FreeTextEntry1] : 1. Continue current medications as outlined above.   2. Cardiovascular exercise as tolerated.  3. Follow up in 6 months with PFT and comprehensive pulmonary assessment.   4. Routine medical follow-up with primary care physician, Dr. Chen.

## 2024-03-19 ENCOUNTER — RX RENEWAL (OUTPATIENT)
Age: 39
End: 2024-03-19

## 2024-03-19 RX ORDER — OMEPRAZOLE 20 MG/1
20 CAPSULE, DELAYED RELEASE ORAL
Qty: 30 | Refills: 5 | Status: ACTIVE | COMMUNITY
Start: 2019-12-19 | End: 1900-01-01

## 2024-05-30 RX ORDER — ALBUTEROL SULFATE 90 UG/1
108 (90 BASE) INHALANT RESPIRATORY (INHALATION) EVERY 6 HOURS
Qty: 1 | Refills: 3 | Status: ACTIVE | COMMUNITY
Start: 2023-07-14 | End: 1900-01-01

## 2024-07-27 ENCOUNTER — NON-APPOINTMENT (OUTPATIENT)
Age: 39
End: 2024-07-27

## 2024-08-02 NOTE — PHYSICAL EXAM
[Normal Oropharynx] : the oropharynx was normal [Supple] : supple [No Respiratory Distress] : no respiratory distress  [No Accessory Muscle Use] : no accessory muscle use [Regular Rhythm] : with a regular rhythm [Normal S1, S2] : normal S1 and S2 [No Extremity Clubbing/Cyanosis] : no extremity clubbing/cyanosis [Soft] : abdomen soft [Normal Bowel Sounds] : normal bowel sounds [Normal Posterior Cervical Nodes] : no posterior cervical lymphadenopathy [Normal Anterior Cervical Nodes] : no anterior cervical lymphadenopathy [de-identified] : obese white female [de-identified] : there is fair to good air entry bilaterally. A slight end expiratory wheezes noted more pronounced with forced maneuvers ,DirectAddress_Unknown

## 2024-08-28 ENCOUNTER — NON-APPOINTMENT (OUTPATIENT)
Age: 39
End: 2024-08-28

## 2024-08-29 ENCOUNTER — NON-APPOINTMENT (OUTPATIENT)
Age: 39
End: 2024-08-29

## 2024-09-06 ENCOUNTER — NON-APPOINTMENT (OUTPATIENT)
Age: 39
End: 2024-09-06

## 2024-09-06 ENCOUNTER — APPOINTMENT (OUTPATIENT)
Dept: INTERNAL MEDICINE | Facility: CLINIC | Age: 39
End: 2024-09-06
Payer: COMMERCIAL

## 2024-09-06 VITALS
HEART RATE: 87 BPM | WEIGHT: 250 LBS | TEMPERATURE: 97.7 F | OXYGEN SATURATION: 97 % | SYSTOLIC BLOOD PRESSURE: 120 MMHG | DIASTOLIC BLOOD PRESSURE: 76 MMHG | BODY MASS INDEX: 44.3 KG/M2 | HEIGHT: 63 IN

## 2024-09-06 DIAGNOSIS — U07.1 COVID-19: ICD-10-CM

## 2024-09-06 DIAGNOSIS — B37.0 CANDIDAL STOMATITIS: ICD-10-CM

## 2024-09-06 PROCEDURE — 99213 OFFICE O/P EST LOW 20 MIN: CPT | Mod: 25

## 2024-09-06 PROCEDURE — 94060 EVALUATION OF WHEEZING: CPT

## 2024-09-06 RX ORDER — TIRZEPATIDE 7.5 MG/.5ML
INJECTION, SOLUTION SUBCUTANEOUS
Refills: 0 | Status: ACTIVE | COMMUNITY

## 2024-09-06 RX ORDER — UBROGEPANT 100 MG/1
TABLET ORAL
Refills: 0 | Status: ACTIVE | COMMUNITY

## 2024-09-06 RX ORDER — PREDNISONE 20 MG/1
20 TABLET ORAL
Qty: 12 | Refills: 0 | Status: DISCONTINUED | COMMUNITY
Start: 2024-08-29 | End: 2024-09-06

## 2024-09-06 RX ORDER — HYDROCODONE BITARTRATE AND HOMATROPINE METHYLBROMIDE 1.5; 5 MG/5ML; MG/5ML
5-1.5 SOLUTION ORAL 4 TIMES DAILY
Qty: 280 | Refills: 0 | Status: ACTIVE | COMMUNITY
Start: 2024-09-06 | End: 1900-01-01

## 2024-09-06 RX ORDER — CLOTRIMAZOLE 10 MG/1
10 LOZENGE ORAL 4 TIMES DAILY
Qty: 56 | Refills: 0 | Status: ACTIVE | COMMUNITY
Start: 2024-09-06 | End: 1900-01-01

## 2024-09-06 RX ORDER — AZITHROMYCIN 500 MG/1
500 TABLET, FILM COATED ORAL
Qty: 5 | Refills: 0 | Status: DISCONTINUED | COMMUNITY
Start: 2024-08-29 | End: 2024-09-06

## 2024-09-06 RX ADMIN — Medication 1 MG/ML: at 00:00

## 2024-09-06 NOTE — HISTORY OF PRESENT ILLNESS
[FreeTextEntry8] : 38F pulmonary patient of Dr. Smith w/ PMHx of HLD, BOSSMAN, Obesity, PreDM, GERD, and Asthma presents to office with c/o barking cough, wheezing, sinus congestion and fatigue. Pt was seen at  on 8/29/24 for Covid, she spoke with Dr. Smith later that night and he prescribed Prednisone 20mg PO BID x 6 days and Azithromycin 500mg PO daily x 5 days, she completed the Prednisone but did not take the Azithromycin. She did not have any improvement in symptoms.

## 2024-09-06 NOTE — PLAN
[FreeTextEntry1] : #Acute Tracheitis s/p Covid infection #Oral Candidiasis Kenalog 40mg IM x 1 dose now Start Prednisone taper 80x2, 60x3, 40x4, 20x5, 10x6 days with food Hycodan PRN Clotrimazole rohan QID x 14 days NeilMed Nasal washes BID

## 2024-09-06 NOTE — REVIEW OF SYSTEMS
[Fever] : no fever [Chills] : no chills [Fatigue] : fatigue [Nasal Discharge] : nasal discharge [Postnasal Drip] : postnasal drip [Chest Pain] : no chest pain [Palpitations] : no palpitations [Shortness Of Breath] : shortness of breath [Wheezing] : wheezing [Cough] : cough [Dyspnea on Exertion] : dyspnea on exertion [Headache] : no headache [Dizziness] : no dizziness [Insomnia] : insomnia [Negative] : Heme/Lymph [de-identified] : 2/2 coughing

## 2024-09-06 NOTE — PHYSICAL EXAM
[No Acute Distress] : no acute distress [Normal Sclera/Conjunctiva] : normal sclera/conjunctiva [EOMI] : extraocular movements intact [Normal Outer Ear/Nose] : the outer ears and nose were normal in appearance [Normal Oropharynx] : the oropharynx was normal [Normal TMs] : both tympanic membranes were normal [No JVD] : no jugular venous distention [No Lymphadenopathy] : no lymphadenopathy [Supple] : supple [No Respiratory Distress] : no respiratory distress  [Normal Rate] : normal rate  [Regular Rhythm] : with a regular rhythm [Normal S1, S2] : normal S1 and S2 [No Edema] : there was no peripheral edema [Soft] : abdomen soft [Normal Anterior Cervical Nodes] : no anterior cervical lymphadenopathy [No Rash] : no rash [Coordination Grossly Intact] : coordination grossly intact [No Focal Deficits] : no focal deficits [Normal Affect] : the affect was normal [Alert and Oriented x3] : oriented to person, place, and time [Normal Insight/Judgement] : insight and judgment were intact [de-identified] : barking cough, wheezing

## 2024-09-13 ENCOUNTER — APPOINTMENT (OUTPATIENT)
Dept: INTERNAL MEDICINE | Facility: CLINIC | Age: 39
End: 2024-09-13
Payer: COMMERCIAL

## 2024-09-13 ENCOUNTER — NON-APPOINTMENT (OUTPATIENT)
Age: 39
End: 2024-09-13

## 2024-09-13 VITALS
SYSTOLIC BLOOD PRESSURE: 120 MMHG | OXYGEN SATURATION: 96 % | HEART RATE: 85 BPM | BODY MASS INDEX: 43.23 KG/M2 | WEIGHT: 244 LBS | HEIGHT: 63 IN | RESPIRATION RATE: 16 BRPM | TEMPERATURE: 97.6 F | DIASTOLIC BLOOD PRESSURE: 72 MMHG

## 2024-09-13 DIAGNOSIS — J04.10 ACUTE TRACHEITIS W/OUT OBSTRUCTION: ICD-10-CM

## 2024-09-13 DIAGNOSIS — J30.9 ALLERGIC RHINITIS, UNSPECIFIED: ICD-10-CM

## 2024-09-13 DIAGNOSIS — J45.909 UNSPECIFIED ASTHMA, UNCOMPLICATED: ICD-10-CM

## 2024-09-13 DIAGNOSIS — R05.9 COUGH, UNSPECIFIED: ICD-10-CM

## 2024-09-13 DIAGNOSIS — J45.40 MODERATE PERSISTENT ASTHMA, UNCOMPLICATED: ICD-10-CM

## 2024-09-13 DIAGNOSIS — R53.83 OTHER FATIGUE: ICD-10-CM

## 2024-09-13 PROCEDURE — 99214 OFFICE O/P EST MOD 30 MIN: CPT | Mod: 25

## 2024-09-13 PROCEDURE — 95012 NITRIC OXIDE EXP GAS DETER: CPT

## 2024-09-13 PROCEDURE — 94060 EVALUATION OF WHEEZING: CPT

## 2024-09-13 RX ORDER — PREDNISONE 20 MG/1
20 TABLET ORAL
Qty: 33 | Refills: 0 | Status: DISCONTINUED | COMMUNITY
Start: 2024-09-06 | End: 2024-09-13

## 2024-09-13 RX ORDER — ALBUTEROL SULFATE 90 UG/1
108 (90 BASE) INHALANT RESPIRATORY (INHALATION)
Qty: 1 | Refills: 11 | Status: ACTIVE | COMMUNITY
Start: 2024-09-13 | End: 1900-01-01

## 2024-09-13 RX ORDER — PROMETHAZINE HYDROCHLORIDE AND DEXTROMETHORPHAN HYDROBROMIDE ORAL SOLUTION 15; 6.25 MG/5ML; MG/5ML
6.25-15 SOLUTION ORAL
Qty: 300 | Refills: 0 | Status: DISCONTINUED | COMMUNITY
Start: 2024-09-05 | End: 2024-09-13

## 2024-09-13 NOTE — PHYSICAL EXAM
[Normal Oropharynx] : the oropharynx was normal [Supple] : supple [No Respiratory Distress] : no respiratory distress  [No Accessory Muscle Use] : no accessory muscle use [Clear to Auscultation] : lungs were clear to auscultation bilaterally [Regular Rhythm] : with a regular rhythm [Normal S1, S2] : normal S1 and S2 [No Extremity Clubbing/Cyanosis] : no extremity clubbing/cyanosis [Soft] : abdomen soft [Normal Bowel Sounds] : normal bowel sounds [Normal Posterior Cervical Nodes] : no posterior cervical lymphadenopathy [Normal Anterior Cervical Nodes] : no anterior cervical lymphadenopathy [Normal Affect] : the affect was normal [Normal Insight/Judgement] : insight and judgment were intact [No Edema] : there was no peripheral edema [Coordination Grossly Intact] : coordination grossly intact [Normal Gait] : normal gait [de-identified] : there is fair to good air entry bilaterally.  A minimal expiratory wheeze may be present with forced maneuvers only. [de-identified] : obese white female

## 2024-09-13 NOTE — HISTORY OF PRESENT ILLNESS
[FreeTextEntry1] :  The patient comes in for a follow-up pulmonary evaluation. [de-identified] : The patient recently had covid which caused an exacerbation of the patient's asthma. She began feeling ill on 8/27/24, when she began experiencing cough, congestion, fatigue, postnasal drip, GI symptoms, and loss of appetite. She was seen at  on 8/29/24 where she tested positive for Covid and spoke with me later that night. I prescribed Prednisone 20mg PO BID x 6 days and Azithromycin 500mg PO daily x 5 days. She reports that she completed the Prednisone but did not take the Azithromycin. She did not have any improvement in symptoms. She then saw NP Michelle Jeffers on 9/6/24 and began taking Prednisone. She took 40 mg for 4 days and is in the midst of taking 20 mg daily, at this time.  Her initial regimen called for her to take 80 mg of prednisone to start, but she did not want to go that high, so she adjusted the regimen on her own.  She is set to taper down to 10 mg in the next few days.  She was also given a Kenalog injection.  She reports that she does have a lingering cough, which is primarily nonproductive, but has otherwise returned to her baseline.  At this time, the patient is feeling relatively well from a pulmonary standpoint. She does have a history of moderate persistent asthma for which she is maintained on Advair 230-21 2 puffs BID and Montelukast 10 MG once daily. She does report that she required the use of her Albuterol rescue inhaler when she had COVID as mentioned above but has not used the inhaler for the past 2-3 days. She denies any overt breathlessness. There have been no fevers, chills, or night sweats. There has been no chest pain, shortness of breath, palpitations, or PND. There have been no other acute constitutional symptoms. She comes in for this assessment.

## 2024-09-13 NOTE — ADDENDUM
[FreeTextEntry1] : This note was written by Elenita Lopez on 09/13/2024 acting as a medical scribe for Dr. Roque Smith MD. All medical entries made by the scribe were at my, Dr. Rouqe Smith's, direction and personally dictated by me on 09/13/2024. I have reviewed the chart and agree that the record accurately reflects my personal performance of the history, review of systems, assessment, and plan. I have also personally directed, reviewed, and agreed with the chart.

## 2024-09-13 NOTE — PLAN
[FreeTextEntry1] : 1. Continue current medications as outlined above.  2. Continue Prednisone taper for treatment of COVID and asthma exacerbation.  To this end, she will complete 5 days of 20 mg daily, followed by 10 mg daily for another 6 days before discontinuing.   3. Follow up in 6 months with PFT    4. Routine medical follow-up with her primary care physician, Dr. Chen.    5. The Influenza and COVID vaccines are recommended in the fall.   6. Cardiovascular exercise as tolerated.

## 2024-09-13 NOTE — DATA REVIEWED
[FreeTextEntry1] : Spirometric analysis is within normal limits.  Slight bronchodilator reactivity is demonstrated.  The fractional excretion of nitric oxide is normal at 10

## 2024-10-16 ENCOUNTER — OFFICE (OUTPATIENT)
Dept: URBAN - METROPOLITAN AREA CLINIC 102 | Facility: CLINIC | Age: 39
Setting detail: OPHTHALMOLOGY
End: 2024-10-16
Payer: COMMERCIAL

## 2024-10-16 DIAGNOSIS — H01.004: ICD-10-CM

## 2024-10-16 DIAGNOSIS — H00.11: ICD-10-CM

## 2024-10-16 DIAGNOSIS — H01.001: ICD-10-CM

## 2024-10-16 DIAGNOSIS — L71.0: ICD-10-CM

## 2024-10-16 PROCEDURE — 92285 EXTERNAL OCULAR PHOTOGRAPHY: CPT | Performed by: OPHTHALMOLOGY

## 2024-10-16 PROCEDURE — 92002 INTRM OPH EXAM NEW PATIENT: CPT | Performed by: OPHTHALMOLOGY

## 2024-10-16 ASSESSMENT — LID EXAM ASSESSMENTS
OD_BLEPHARITIS: 2+
OS_BLEPHARITIS: 2+

## 2024-10-16 ASSESSMENT — CONFRONTATIONAL VISUAL FIELD TEST (CVF)
OD_FINDINGS: FULL
OS_FINDINGS: FULL

## 2024-10-16 ASSESSMENT — VISUAL ACUITY
OD_BCVA: 20/20
OS_BCVA: 20/20

## 2024-10-30 ENCOUNTER — OFFICE (OUTPATIENT)
Dept: URBAN - METROPOLITAN AREA CLINIC 102 | Facility: CLINIC | Age: 39
Setting detail: OPHTHALMOLOGY
End: 2024-10-30
Payer: COMMERCIAL

## 2024-10-30 DIAGNOSIS — H01.001: ICD-10-CM

## 2024-10-30 DIAGNOSIS — H00.11: ICD-10-CM

## 2024-10-30 DIAGNOSIS — L71.0: ICD-10-CM

## 2024-10-30 DIAGNOSIS — H01.004: ICD-10-CM

## 2024-10-30 PROBLEM — H52.7 REFRACTIVE ERROR: Status: ACTIVE | Noted: 2024-10-16

## 2024-10-30 PROCEDURE — 92012 INTRM OPH EXAM EST PATIENT: CPT | Performed by: OPHTHALMOLOGY

## 2024-10-30 ASSESSMENT — TONOMETRY
OS_IOP_MMHG: 13
OD_IOP_MMHG: 14

## 2024-10-30 ASSESSMENT — LID EXAM ASSESSMENTS
OD_BLEPHARITIS: 2+
OS_BLEPHARITIS: 2+

## 2024-10-30 ASSESSMENT — CONFRONTATIONAL VISUAL FIELD TEST (CVF)
OD_FINDINGS: FULL
OS_FINDINGS: FULL

## 2024-10-30 ASSESSMENT — VISUAL ACUITY
OD_BCVA: 20/20
OS_BCVA: 20/20

## 2025-01-16 ENCOUNTER — RX RENEWAL (OUTPATIENT)
Age: 40
End: 2025-01-16

## 2025-03-19 ENCOUNTER — NON-APPOINTMENT (OUTPATIENT)
Age: 40
End: 2025-03-19

## 2025-03-28 ENCOUNTER — APPOINTMENT (OUTPATIENT)
Dept: INTERNAL MEDICINE | Facility: CLINIC | Age: 40
End: 2025-03-28
Payer: COMMERCIAL

## 2025-03-28 VITALS
DIASTOLIC BLOOD PRESSURE: 73 MMHG | HEART RATE: 83 BPM | TEMPERATURE: 98.1 F | OXYGEN SATURATION: 99 % | RESPIRATION RATE: 14 BRPM | WEIGHT: 236 LBS | HEIGHT: 63 IN | SYSTOLIC BLOOD PRESSURE: 107 MMHG | BODY MASS INDEX: 41.82 KG/M2

## 2025-03-28 DIAGNOSIS — J98.4 OTHER DISORDERS OF LUNG: ICD-10-CM

## 2025-03-28 DIAGNOSIS — G47.33 OBSTRUCTIVE SLEEP APNEA (ADULT) (PEDIATRIC): ICD-10-CM

## 2025-03-28 DIAGNOSIS — J45.40 MODERATE PERSISTENT ASTHMA, UNCOMPLICATED: ICD-10-CM

## 2025-03-28 DIAGNOSIS — J45.909 UNSPECIFIED ASTHMA, UNCOMPLICATED: ICD-10-CM

## 2025-03-28 DIAGNOSIS — J30.9 ALLERGIC RHINITIS, UNSPECIFIED: ICD-10-CM

## 2025-03-28 PROCEDURE — 94727 GAS DIL/WSHOT DETER LNG VOL: CPT

## 2025-03-28 PROCEDURE — ZZZZZ: CPT

## 2025-03-28 PROCEDURE — 99215 OFFICE O/P EST HI 40 MIN: CPT | Mod: 25

## 2025-03-28 PROCEDURE — 94729 DIFFUSING CAPACITY: CPT

## 2025-03-28 PROCEDURE — 94060 EVALUATION OF WHEEZING: CPT

## 2025-03-28 RX ORDER — UBROGEPANT 100 MG/1
100 TABLET ORAL
Refills: 0 | Status: ACTIVE | COMMUNITY

## 2025-04-04 ENCOUNTER — NON-APPOINTMENT (OUTPATIENT)
Age: 40
End: 2025-04-04

## 2025-04-07 ENCOUNTER — NON-APPOINTMENT (OUTPATIENT)
Age: 40
End: 2025-04-07

## 2025-04-08 RX ORDER — HYDROCODONE BITARTRATE AND HOMATROPINE METHYLBROMIDE 1.5; 5 MG/5ML; MG/5ML
5-1.5 SOLUTION ORAL EVERY 6 HOURS
Qty: 240 | Refills: 0 | Status: ACTIVE | COMMUNITY
Start: 2025-04-04 | End: 1900-01-01

## 2025-04-09 ENCOUNTER — NON-APPOINTMENT (OUTPATIENT)
Age: 40
End: 2025-04-09

## 2025-04-09 DIAGNOSIS — U07.1 COVID-19: ICD-10-CM

## 2025-04-09 DIAGNOSIS — R05.9 COUGH, UNSPECIFIED: ICD-10-CM

## 2025-04-09 RX ORDER — AZITHROMYCIN 500 MG/1
500 TABLET, FILM COATED ORAL DAILY
Qty: 7 | Refills: 0 | Status: DISCONTINUED | COMMUNITY
Start: 2025-04-04 | End: 2025-04-09

## 2025-04-09 RX ORDER — PREDNISONE 20 MG/1
20 TABLET ORAL
Qty: 33 | Refills: 0 | Status: ACTIVE | COMMUNITY
Start: 2025-04-09 | End: 1900-01-01

## 2025-04-09 RX ORDER — PREDNISONE 20 MG/1
20 TABLET ORAL
Qty: 12 | Refills: 0 | Status: DISCONTINUED | COMMUNITY
Start: 2025-04-04 | End: 2025-04-09

## 2025-06-21 ENCOUNTER — NON-APPOINTMENT (OUTPATIENT)
Age: 40
End: 2025-06-21